# Patient Record
Sex: FEMALE | Race: WHITE | Employment: FULL TIME | URBAN - METROPOLITAN AREA
[De-identification: names, ages, dates, MRNs, and addresses within clinical notes are randomized per-mention and may not be internally consistent; named-entity substitution may affect disease eponyms.]

---

## 2021-03-27 ENCOUNTER — OFFICE VISIT (OUTPATIENT)
Dept: PRIMARY CARE CLINIC | Age: 56
End: 2021-03-27
Payer: COMMERCIAL

## 2021-03-27 VITALS
SYSTOLIC BLOOD PRESSURE: 122 MMHG | DIASTOLIC BLOOD PRESSURE: 80 MMHG | TEMPERATURE: 97.3 F | OXYGEN SATURATION: 95 % | HEIGHT: 66 IN | WEIGHT: 195.2 LBS | HEART RATE: 84 BPM | BODY MASS INDEX: 31.37 KG/M2

## 2021-03-27 DIAGNOSIS — M75.41 CORACOID IMPINGEMENT OF RIGHT SHOULDER: ICD-10-CM

## 2021-03-27 DIAGNOSIS — K58.0 IRRITABLE BOWEL SYNDROME WITH DIARRHEA: ICD-10-CM

## 2021-03-27 DIAGNOSIS — F41.8 DEPRESSION WITH ANXIETY: Primary | ICD-10-CM

## 2021-03-27 DIAGNOSIS — Z13.1 DIABETES MELLITUS SCREENING: ICD-10-CM

## 2021-03-27 DIAGNOSIS — S83.206S TEAR OF MENISCUS OF RIGHT KNEE AS CURRENT INJURY, UNSPECIFIED MENISCUS, UNSPECIFIED TEAR TYPE, SEQUELA: ICD-10-CM

## 2021-03-27 DIAGNOSIS — Z13.220 SCREENING CHOLESTEROL LEVEL: ICD-10-CM

## 2021-03-27 DIAGNOSIS — M17.10 ARTHRITIS OF KNEE: ICD-10-CM

## 2021-03-27 DIAGNOSIS — M77.8 SHOULDER TENDONITIS, RIGHT: ICD-10-CM

## 2021-03-27 DIAGNOSIS — Z00.00 WELLNESS EXAMINATION: ICD-10-CM

## 2021-03-27 DIAGNOSIS — Z12.31 BREAST CANCER SCREENING BY MAMMOGRAM: ICD-10-CM

## 2021-03-27 PROBLEM — S83.206A TEAR OF MENISCUS OF RIGHT KNEE AS CURRENT INJURY: Status: ACTIVE | Noted: 2021-03-27

## 2021-03-27 PROCEDURE — 99203 OFFICE O/P NEW LOW 30 MIN: CPT | Performed by: FAMILY MEDICINE

## 2021-03-27 PROCEDURE — G8417 CALC BMI ABV UP PARAM F/U: HCPCS | Performed by: FAMILY MEDICINE

## 2021-03-27 PROCEDURE — 1036F TOBACCO NON-USER: CPT | Performed by: FAMILY MEDICINE

## 2021-03-27 PROCEDURE — G8484 FLU IMMUNIZE NO ADMIN: HCPCS | Performed by: FAMILY MEDICINE

## 2021-03-27 PROCEDURE — G8427 DOCREV CUR MEDS BY ELIG CLIN: HCPCS | Performed by: FAMILY MEDICINE

## 2021-03-27 PROCEDURE — 3017F COLORECTAL CA SCREEN DOC REV: CPT | Performed by: FAMILY MEDICINE

## 2021-03-27 RX ORDER — ASCORBIC ACID 250 MG
250 TABLET ORAL DAILY
Qty: 30 TABLET | Refills: 3 | COMMUNITY
Start: 2021-03-27 | End: 2022-03-18

## 2021-03-27 RX ORDER — ESCITALOPRAM OXALATE 10 MG/1
10 TABLET ORAL DAILY
Qty: 90 TABLET | Refills: 2
Start: 2021-03-27 | End: 2021-07-12 | Stop reason: SDUPTHER

## 2021-03-27 RX ORDER — GREEN TEA/HOODIA GORDONII 315-12.5MG
1 CAPSULE ORAL DAILY
Qty: 30 TABLET | Refills: 0 | COMMUNITY
Start: 2021-03-27 | End: 2021-04-26

## 2021-03-27 RX ORDER — M-VIT,TX,IRON,MINS/CALC/FOLIC 27MG-0.4MG
1 TABLET ORAL DAILY
COMMUNITY
Start: 2021-03-27

## 2021-03-27 SDOH — ECONOMIC STABILITY: FOOD INSECURITY: WITHIN THE PAST 12 MONTHS, YOU WORRIED THAT YOUR FOOD WOULD RUN OUT BEFORE YOU GOT MONEY TO BUY MORE.: NEVER TRUE

## 2021-03-27 SDOH — ECONOMIC STABILITY: FOOD INSECURITY: WITHIN THE PAST 12 MONTHS, THE FOOD YOU BOUGHT JUST DIDN'T LAST AND YOU DIDN'T HAVE MONEY TO GET MORE.: NEVER TRUE

## 2021-03-27 SDOH — ECONOMIC STABILITY: INCOME INSECURITY: HOW HARD IS IT FOR YOU TO PAY FOR THE VERY BASICS LIKE FOOD, HOUSING, MEDICAL CARE, AND HEATING?: NOT HARD AT ALL

## 2021-03-27 SDOH — ECONOMIC STABILITY: TRANSPORTATION INSECURITY
IN THE PAST 12 MONTHS, HAS THE LACK OF TRANSPORTATION KEPT YOU FROM MEDICAL APPOINTMENTS OR FROM GETTING MEDICATIONS?: NO

## 2021-03-27 ASSESSMENT — PATIENT HEALTH QUESTIONNAIRE - PHQ9
1. LITTLE INTEREST OR PLEASURE IN DOING THINGS: 0
SUM OF ALL RESPONSES TO PHQ QUESTIONS 1-9: 0
SUM OF ALL RESPONSES TO PHQ9 QUESTIONS 1 & 2: 0

## 2021-03-27 NOTE — PROGRESS NOTES
[facility] PRIMARY CARE  [address] [place] [id number]  Dept: [phone]    Richard Alfonso is a 63 y.o. female New patient, who presents today for her medical conditions/complaintsas noted below. Chief Complaint   Patient presents with    New Patient       HPI:     HPI    Reviewed prior notes None  Reviewed previous none  Psyllium every other day. Pill for IBS  She needs cortisone in her knees off and on. Hx of knee issues x years: mensicus and arthritis  Colonoscopy at 46 yo in [place]. Is also living in [place]. has moved back to [facility] to be close to family. hot flashes x 20 years: came back worse.    No results found for: LDLCHOLESTEROL, LDLCALC    (goal LDL is <100)   No results found for: AST, ALT, BUN, LABA1C, TSH  BP Readings from Last 3 Encounters:   21 122/80          (goal 120/80)    Past Medical History:   Diagnosis Date    Anxiety     Asthma     Former     Depression     Minor     Sleep apnea     dont use a machine ( lost weight )      Past Surgical History:   Procedure Laterality Date    GALLBLADDER SURGERY      HYSTERECTOMY, VAGINAL      OVARIES ONLY        Family History   Problem Relation Age of Onset    Arrhythmia Mother     Cancer Mother     Depression Mother     High Cholesterol Mother     Diabetes Mother    [de-identified] / [place] Mother     Other Mother     Arrhythmia Father     Heart Disease Father     High Cholesterol Father     Other Maternal Grandmother        Social History     Tobacco Use    Smoking status: Former Smoker     Packs/day: 1.50     Years: 3.00     Pack years: 4.50     Types: Cigarettes     Quit date: 1998     Years since quittin.3    Smokeless tobacco: Never Used   Substance Use Topics    Alcohol use: Yes     Frequency: Never     Comment: Once in awhile       Current Outpatient Medications   Medication Sig Dispense Refill    escitalopram (LEXAPRO) 10 MG tablet Take 1 tablet by mouth daily 90 tablet 2    Multiple Vitamins-Minerals (THERAPEUTIC MULTIVITAMIN-MINERALS) tablet Take 1 tablet by mouth daily      Probiotic Acidophilus (FLORANEX) TABS Take 1 tablet by mouth daily 30 tablet 0    ascorbic acid (V-R VITAMIN C) 250 MG tablet Take 1 tablet by mouth daily 30 tablet 3     No current facility-administered medications for this visit. Allergies   Allergen Reactions    Bactrim [Sulfamethoxazole-Trimethoprim] Hives and Rash       Health Maintenance   Topic Date Due    Hepatitis C screen  Never done    HIV screen  Never done    DTaP/Tdap/Td vaccine (1 - Tdap) Never done    Cervical cancer screen  Never done    Lipid screen  Never done    Diabetes screen  Never done    Breast cancer screen  Never done    Shingles Vaccine (1 of 2) Never done    Colon cancer screen colonoscopy  Never done    Flu vaccine (1) 09/01/2020    COVID-19 Vaccine  Completed    Hepatitis A vaccine  Aged Out    Hepatitis B vaccine  Aged Out    Hib vaccine  Aged Out    Meningococcal (ACWY) vaccine  Aged Out    Pneumococcal 0-64 years Vaccine  Aged Out       Subjective:      Review of Systems  Knee pains,   Right shoulder pains  Works as a   She has flat feet and her shoes she noticed her wearing down on inward heel. The shoes she is wearing are not very supportive for the feet. Objective:     /80   Pulse 84   Temp 97.3 °F (36.3 °C)   Ht 5' 6\" (1.676 m)   Wt 195 lb 3.2 oz (88.5 kg)   SpO2 95%   BMI 31.51 kg/m²   Physical Exam  Vitals signs and nursing note reviewed. Constitutional:       General: She is not in acute distress. Appearance: She is well-developed. She is not ill-appearing. HENT:      Head: Normocephalic and atraumatic. Right Ear: External ear normal.      Left Ear: External ear normal.   Eyes:      General: No scleral icterus. Right eye: No discharge. Left eye: No discharge.       Conjunctiva/sclera: Conjunctivae normal.      Pupils: Pupils are equal, round, and reactive to light. Neck:      Thyroid: No thyromegaly. Trachea: No tracheal deviation. Cardiovascular:      Rate and Rhythm: Normal rate and regular rhythm. Heart sounds: Normal heart sounds. Pulmonary:      Effort: Pulmonary effort is normal. No respiratory distress. Breath sounds: Normal breath sounds. No wheezing. Musculoskeletal:      Comments: Right shoulder ROM decreased abduction to 90 degrees  Forward flexion 160 degrees, decreased rotation  Tender shoulder post cuff. Lymphadenopathy:      Cervical: No cervical adenopathy. Skin:     General: Skin is warm. Findings: No rash. Neurological:      Mental Status: She is alert and oriented to person, place, and time. Psychiatric:         Mood and Affect: Mood normal.         Behavior: Behavior normal.         Thought Content: Thought content normal.       Assessment:       Diagnosis Orders   1. Depression with anxiety  escitalopram (LEXAPRO) 10 MG tablet   2. Irritable bowel syndrome with diarrhea     3. Tear of meniscus of right knee as current injury, unspecified meniscus, unspecified tear type, sequela     4. Arthritis of knee     5. Breast cancer screening by mammogram  DHIRAJ DIGITAL SCREEN W OR WO CAD BILATERAL   6. Shoulder tendonitis, right     7. Coracoid impingement of right shoulder     8. Wellness examination     9. Diabetes mellitus screening  Glucose, Fasting   10. Screening cholesterol level  Lipid Panel        Plan:      No follow-ups on file. Soy milk and estroven for hot flashes  Right shoulder exercises and if not better in 3 weeks see PT  Needs good supportive shoes for the feet. She states her physical is due in July. She should try to get a copy of her colonoscopy so we can register this in her chart.     Orders Placed This Encounter   Procedures    DHIRAJ DIGITAL SCREEN W OR WO CAD BILATERAL     Standing Status:   Future     Standing Expiration Date:   3/27/2022     Scheduling

## 2021-03-27 NOTE — PATIENT INSTRUCTIONS
Patient Education        Rotator Cuff: Exercises  Introduction  Here are some examples of exercises for you to try. The exercises may be suggested for a condition or for rehabilitation. Start each exercise slowly. Ease off the exercises if you start to have pain. You will be told when to start these exercises and which ones will work best for you. How to do the exercises  Pendulum swing   If you have pain in your back, do not do this exercise. 1. Hold on to a table or the back of a chair with your good arm. Then bend forward a little and let your sore arm hang straight down. This exercise does not use the arm muscles. Rather, use your legs and your hips to create movement that makes your arm swing freely. 2. Use the movement from your hips and legs to guide the slightly swinging arm back and forth like a pendulum (or elephant trunk). Then guide it in circles that start small (about the size of a dinner plate). Make the circles a bit larger each day, as your pain allows. 3. Do this exercise for 5 minutes, 5 to 7 times each day. 4. As you have less pain, try bending over a little farther to do this exercise. This will increase the amount of movement at your shoulder. Posterior stretching exercise   1. Hold the elbow of your injured arm with your other hand. 2. Use your hand to pull your injured arm gently up and across your body. You will feel a gentle stretch across the back of your injured shoulder. 3. Hold for at least 15 to 30 seconds. Then slowly lower your arm. 4. Repeat 2 to 4 times. Up-the-back stretch   Your doctor or physical therapist may want you to wait to do this stretch until you have regained most of your range of motion and strength. You can do this stretch in different ways. Hold any of these stretches for at least 15 to 30 seconds. Repeat them 2 to 4 times. 1. Light stretch: Put your hand in your back pocket. Let it rest there to stretch your shoulder. 2. Moderate stretch:  With your other hand, hold your injured arm (palm outward) behind your back by the wrist. Pull your arm up gently to stretch your shoulder. 3. Advanced stretch: Put a towel over your other shoulder. Put the hand of your injured arm behind your back. Now hold the back end of the towel. With the other hand, hold the front end of the towel in front of your body. Pull gently on the front end of the towel. This will bring your hand farther up your back to stretch your shoulder. Overhead stretch   1. Standing about an arm's length away, grasp onto a solid surface. You could use a countertop, a doorknob, or the back of a sturdy chair. 2. With your knees slightly bent, bend forward with your arms straight. Lower your upper body, and let your shoulders stretch. 3. As your shoulders are able to stretch farther, you may need to take a step or two backward. 4. Hold for at least 15 to 30 seconds. Then stand up and relax. If you had stepped back during your stretch, step forward so you can keep your hands on the solid surface. 5. Repeat 2 to 4 times. Shoulder flexion (lying down)   To make a wand for this exercise, use a piece of PVC pipe or a broom handle with the broom removed. Make the wand about a foot wider than your shoulders. 1. Lie on your back, holding a wand with both hands. Your palms should face down as you hold the wand. 2. Keeping your elbows straight, slowly raise your arms over your head. Raise them until you feel a stretch in your shoulders, upper back, and chest.  3. Hold for 15 to 30 seconds. 4. Repeat 2 to 4 times. Shoulder rotation (lying down)   To make a wand for this exercise, use a piece of PVC pipe or a broom handle with the broom removed. Make the wand about a foot wider than your shoulders. 1. Lie on your back. Hold a wand with both hands with your elbows bent and palms up. 2. Keep your elbows close to your body, and move the wand across your body toward the sore arm.   3. Hold for 8 to 12 seconds. 4. Repeat 2 to 4 times. Wall climbing (to the side)   Avoid any movement that is straight to your side, and be careful not to arch your back. Your arm should stay about 30 degrees to the front of your side. 1. Stand with your side to a wall so that your fingers can just touch it at an angle about 30 degrees toward the front of your body. 2. Walk the fingers of your injured arm up the wall as high as pain permits. Try not to shrug your shoulder up toward your ear as you move your arm up. 3. Hold that position for a count of at least 15 to 20.  4. Walk your fingers back down to the starting position. 5. Repeat at least 2 to 4 times. Try to reach higher each time. Wall climbing (to the front)   During this stretching exercise, be careful not to arch your back. 1. Face a wall, and stand so your fingers can just touch it. 2. Keeping your shoulder down, walk the fingers of your injured arm up the wall as high as pain permits. (Don't shrug your shoulder up toward your ear.)  3. Hold your arm in that position for at least 15 to 30 seconds. 4. Slowly walk your fingers back down to where you started. 5. Repeat at least 2 to 4 times. Try to reach higher each time. Shoulder blade squeeze   1. Stand with your arms at your sides, and squeeze your shoulder blades together. Do not raise your shoulders up as you squeeze. 2. Hold 6 seconds. 3. Repeat 8 to 12 times. Scapular exercise: Arm reach   1. Lie flat on your back. This exercise is a very slight motion that starts with your arms raised (elbows straight, arms straight). 2. From this position, reach higher toward the kostas or ceiling. Keep your elbows straight. All motion should be from your shoulder blade only. 3. Relax your arms back to where you started. 4. Repeat 8 to 12 times. Arm raise to the side   During this strengthening exercise, your arm should stay about 30 degrees to the front of your side.   1. Slowly raise your injured arm to the side, with your thumb facing up. Raise your arm 60 degrees at the most (shoulder level is 90 degrees). 2. Hold the position for 3 to 5 seconds. Then lower your arm back to your side. If you need to, bring your \"good\" arm across your body and place it under the elbow as you lower your injured arm. Use your good arm to keep your injured arm from dropping down too fast.  3. Repeat 8 to 12 times. 4. When you first start out, don't hold any extra weight in your hand. As you get stronger, you may use a 1-pound to 2-pound dumbbell or a small can of food. Shoulder flexor and extensor exercise   These are isometric exercises. That means you contract your muscles without actually moving. 1. Push forward (flex): Stand facing a wall or doorjamb, about 6 inches or less back. Hold your injured arm against your body. Make a closed fist with your thumb on top. Then gently push your hand forward into the wall with about 25% to 50% of your strength. Don't let your body move backward as you push. Hold for about 6 seconds. Relax for a few seconds. Repeat 8 to 12 times. 2. Push backward (extend): Stand with your back flat against a wall. Your upper arm should be against the wall, with your elbow bent 90 degrees (your hand straight ahead). Push your elbow gently back against the wall with about 25% to 50% of your strength. Don't let your body move forward as you push. Hold for about 6 seconds. Relax for a few seconds. Repeat 8 to 12 times. Scapular exercise: Wall push-ups   This exercise is best done with your fingers somewhat turned out, rather than straight up and down. 1. Stand facing a wall, about 12 inches to 18 inches away. 2. Place your hands on the wall at shoulder height. 3. Slowly bend your elbows and bring your face to the wall. Keep your back and hips straight. 4. Push back to where you started. 5. Repeat 8 to 12 times.   6. When you can do this exercise against a wall comfortably, you can try it against a counter. You can then slowly progress to the end of a couch, then to a sturdy chair, and finally to the floor. Scapular exercise: Retraction   For this exercise, you will need elastic exercise material, such as surgical tubing or Thera-Band. 1. Put the band around a solid object at about waist level. (A bedpost will work well.) Each hand should hold an end of the band. 2. With your elbows at your sides and bent to 90 degrees, pull the band back. Your shoulder blades should move toward each other. Then move your arms back where you started. 3. Repeat 8 to 12 times. 4. If you have good range of motion in your shoulders, try this exercise with your arms lifted out to the sides. Keep your elbows at a 90-degree angle. Raise the elastic band up to about shoulder level. Pull the band back to move your shoulder blades toward each other. Then move your arms back where you started. Internal rotator strengthening exercise   1. Start by tying a piece of elastic exercise material to a doorknob. You can use surgical tubing or Thera-Band. 2. Stand or sit with your shoulder relaxed and your elbow bent 90 degrees. Your upper arm should rest comfortably against your side. Squeeze a rolled towel between your elbow and your body for comfort. This will help keep your arm at your side. 3. Hold one end of the elastic band in the hand of the painful arm. 4. Slowly rotate your forearm toward your body until it touches your belly. Slowly move it back to where you started. 5. Keep your elbow and upper arm firmly tucked against the towel roll or at your side. 6. Repeat 8 to 12 times. External rotator strengthening exercise   1. Start by tying a piece of elastic exercise material to a doorknob. You can use surgical tubing or Thera-Band. (You may also hold one end of the band in each hand.)  2. Stand or sit with your shoulder relaxed and your elbow bent 90 degrees. Your upper arm should rest comfortably against your side. Squeeze a rolled towel between your elbow and your body for comfort. This will help keep your arm at your side. 3. Hold one end of the elastic band with the hand of the painful arm. 4. Start with your forearm across your belly. Slowly rotate the forearm out away from your body. Keep your elbow and upper arm tucked against the towel roll or the side of your body until you begin to feel tightness in your shoulder. Slowly move your arm back to where you started. 5. Repeat 8 to 12 times. Follow-up care is a key part of your treatment and safety. Be sure to make and go to all appointments, and call your doctor if you are having problems. It's also a good idea to know your test results and keep a list of the medicines you take. Where can you learn more? Go to https://chnancieb.NVELO. org and sign in to your [employer] account. Enter Juanito Dolan in the Flukle box to learn more about \"Rotator Cuff: Exercises. \"     If you do not have an account, please click on the \"Sign Up Now\" link. Current as of: November 16, 2020               Content Version: 12.8  © 0498-2926 [employer], [employer]. Care instructions adapted under license by [facility] ([facility]). If you have questions about a medical condition or this instruction, always ask your healthcare professional. [place] [address] any warranty or liability for your use of this information. Patient Education        Knee Arthritis: Exercises  Introduction  Here are some examples of exercises for you to try. The exercises may be suggested for a condition or for rehabilitation. Start each exercise slowly. Ease off the exercises if you start to have pain. You will be told when to start these exercises and which ones will work best for you. How to do the exercises  Knee flexion with heel slide   5. Lie on your back with your knees bent. 6. Slide your heel back by bending your affected knee as far as you can.  Then hook your other foot around your ankle to help pull your heel even farther back. 7. Hold for about 6 seconds, then rest for up to 10 seconds. 8. Repeat 8 to 12 times. 9. Switch legs and repeat steps 1 through 4, even if only one knee is sore. Quad sets   5. Sit with your affected leg straight and supported on the floor or a firm bed. Place a small, rolled-up towel under your knee. Your other leg should be bent, with that foot flat on the floor. 6. Tighten the thigh muscles of your affected leg by pressing the back of your knee down into the towel. 7. Hold for about 6 seconds, then rest for up to 10 seconds. 8. Repeat 8 to 12 times. 9. Switch legs and repeat steps 1 through 4, even if only one knee is sore. Straight-leg raises to the front   4. Lie on your back with your good knee bent so that your foot rests flat on the floor. Your affected leg should be straight. Make sure that your low back has a normal curve. You should be able to slip your hand in between the floor and the small of your back, with your palm touching the floor and your back touching the back of your hand. 5. Tighten the thigh muscles in your affected leg by pressing the back of your knee flat down to the floor. Hold your knee straight. 6. Keeping the thigh muscles tight and your leg straight, lift your affected leg up so that your heel is about 12 inches off the floor. Hold for about 6 seconds, then lower slowly. 7. Relax for up to 10 seconds between repetitions. 8. Repeat 8 to 12 times. 9. Switch legs and repeat steps 1 through 5, even if only one knee is sore. Active knee flexion   6. Lie on your stomach with your knees straight. If your kneecap is uncomfortable, roll up a washcloth and put it under your leg just above your kneecap. 7. Lift the foot of your affected leg by bending the knee so that you bring the foot up toward your buttock. If this motion hurts, try it without bending your knee quite as far.  This may help you avoid any

## 2021-03-30 ENCOUNTER — HOSPITAL ENCOUNTER (OUTPATIENT)
Dept: MAMMOGRAPHY | Age: 56
Discharge: HOME | End: 2021-04-01
Payer: COMMERCIAL

## 2021-03-30 DIAGNOSIS — Z12.31 BREAST CANCER SCREENING BY MAMMOGRAM: ICD-10-CM

## 2021-03-30 PROCEDURE — 77063 BREAST TOMOSYNTHESIS BI: CPT

## 2021-07-12 ENCOUNTER — OFFICE VISIT (OUTPATIENT)
Dept: PRIMARY CARE CLINIC | Age: 56
End: 2021-07-12
Payer: COMMERCIAL

## 2021-07-12 VITALS
DIASTOLIC BLOOD PRESSURE: 78 MMHG | OXYGEN SATURATION: 98 % | BODY MASS INDEX: 33.44 KG/M2 | WEIGHT: 207.2 LBS | HEART RATE: 92 BPM | SYSTOLIC BLOOD PRESSURE: 116 MMHG

## 2021-07-12 DIAGNOSIS — Z00.00 WELLNESS EXAMINATION: Primary | ICD-10-CM

## 2021-07-12 DIAGNOSIS — F41.8 DEPRESSION WITH ANXIETY: ICD-10-CM

## 2021-07-12 DIAGNOSIS — K58.0 IRRITABLE BOWEL SYNDROME WITH DIARRHEA: ICD-10-CM

## 2021-07-12 PROCEDURE — 99396 PREV VISIT EST AGE 40-64: CPT | Performed by: FAMILY MEDICINE

## 2021-07-12 RX ORDER — LOPERAMIDE HYDROCHLORIDE 2 MG/1
2 CAPSULE ORAL 4 TIMES DAILY PRN
COMMUNITY

## 2021-07-12 RX ORDER — ESCITALOPRAM OXALATE 10 MG/1
TABLET ORAL
Qty: 90 TABLET | Refills: 2 | Status: SHIPPED | OUTPATIENT
Start: 2021-07-12 | End: 2022-03-18 | Stop reason: ALTCHOICE

## 2021-07-12 RX ORDER — ESCITALOPRAM OXALATE 10 MG/1
5 TABLET ORAL DAILY
Qty: 90 TABLET | Refills: 2
Start: 2021-07-12 | End: 2021-07-12 | Stop reason: SDUPTHER

## 2021-07-12 RX ORDER — DICYCLOMINE HYDROCHLORIDE 10 MG/1
10 CAPSULE ORAL 4 TIMES DAILY PRN
Qty: 120 CAPSULE | Refills: 5 | Status: SHIPPED | OUTPATIENT
Start: 2021-07-12 | End: 2022-03-18

## 2021-07-12 RX ORDER — METHYLCELLULOSE 500 MG/1
TABLET ORAL
COMMUNITY

## 2021-07-12 NOTE — PATIENT INSTRUCTIONS
Patient Education        Well Visit, Women 48 to 72: Care Instructions  Overview     Well visits can help you stay healthy. Your doctor has checked your overall health and may have suggested ways to take good care of yourself. Your doctor also may have recommended tests. At home, you can help prevent illness with healthy eating, regular exercise, and other steps. Follow-up care is a key part of your treatment and safety. Be sure to make and go to all appointments, and call your doctor if you are having problems. It's also a good idea to know your test results and keep a list of the medicines you take. How can you care for yourself at home? · Get screening tests that you and your doctor decide on. Screening helps find diseases before any symptoms appear. · Eat healthy foods. Choose fruits, vegetables, whole grains, protein, and low-fat dairy foods. Limit fat, especially saturated fat. Reduce salt in your diet. · Limit alcohol. Have no more than 1 drink a day or 7 drinks a week. · Get at least 30 minutes of exercise on most days of the week. Walking is a good choice. You also may want to do other activities, such as running, swimming, cycling, or playing tennis or team sports. · Reach and stay at a healthy weight. This will lower your risk for many problems, such as obesity, diabetes, heart disease, and high blood pressure. · Do not smoke. Smoking can make health problems worse. If you need help quitting, talk to your doctor about stop-smoking programs and medicines. These can increase your chances of quitting for good. · Care for your mental health. It is easy to get weighed down by worry and stress. Learn strategies to manage stress, like deep breathing and mindfulness, and stay connected with your family and community. If you find you often feel sad or hopeless, talk with your doctor. Treatment can help.   · Talk to your doctor about whether you have any risk factors for sexually transmitted infections (STIs). You can help prevent STIs if you wait to have sex with a new partner (or partners) until you've each been tested for STIs. It also helps if you use condoms (male or female condoms) and if you limit your sex partners to one person who only has sex with you. Vaccines are available for some STIs. · If you think you may have a problem with alcohol or drug use, talk to your doctor. This includes prescription medicines (such as amphetamines and opioids) and illegal drugs (such as cocaine and methamphetamine). Your doctor can help you figure out what type of treatment is best for you. · Protect your skin from too much sun. When you're outdoors from 10 a.m. to 4 p.m., stay in the shade or cover up with clothing and a hat with a wide brim. Wear sunglasses that block UV rays. Even when it's cloudy, put broad-spectrum sunscreen (SPF 30 or higher) on any exposed skin. · See a dentist one or two times a year for checkups and to have your teeth cleaned. · Wear a seat belt in the car. When should you call for help? Watch closely for changes in your health, and be sure to contact your doctor if you have any problems or symptoms that concern you. Where can you learn more? Go to [email or web address] [email or web address] org and sign in to your Zero Locus account. Enter I871 in the [facility] box to learn more about \"Well Visit, Women 50 to 72: Care Instructions. \"     If you do not have an account, please click on the \"Sign Up Now\" link. Current as of: May 27, 2020               Content Version: 12.9  © 9810-7857 Healthwise, [employer]. Care instructions adapted under license by [facility] ([facility]). If you have questions about a medical condition or this instruction, always ask your healthcare professional. [place] [address] any warranty or liability for your use of this information.          Patient Education        tetanus, diphtheria, acellular pertussis vaccine (Tdap)  Pronunciation:  TET a nus, the Tdap vaccine may not provide protection from disease in every person. What should I discuss with my healthcare provider before receiving this vaccine? You should not receive this vaccine if:  · you had a life-threatening allergic reaction to any vaccine that contains tetanus, diphtheria, or pertussis; or  · you had a neurologic disorder affecting your brain (such as loss of consciousness or a prolonged seizure) within 7 days after having a previous pertussis vaccine. You may not be able to receive a Tdap vaccine if you have ever received a similar vaccine that caused any of the following:  · a very high fever (over 104 degrees Fahrenheit);  · a neurologic disorder or disease affecting the brain;  · fainting or going into shock;  · severe pain, redness, tenderness, swelling, or a lump where the shot was given;  · an allergy to latex rubber;  · severe or uncontrolled epilepsy or other seizure disorder; or  · Guillain-Barré syndrome (within 6 weeks after receiving a vaccine containing tetanus). If you have any of these other conditions, your vaccine may need to be postponed or not given at all:  · a history of seizures;  · a weak immune system caused by disease, bone marrow transplant, or by using certain medicines or receiving cancer treatments; or  · if it has been less than 10 years since you last received a tetanus shot. You can still receive a vaccine if you have a minor cold. In the case of a more severe illness with a fever or any type of infection, wait until you get better before receiving this vaccine. It is not known whether Tdap vaccine will harm an unborn baby. However, you may need a Tdap vaccine during pregnancy to protect your  baby from pertussis. [facility] babies are most at risk for severe, life-threatening complications from pertussis. Your doctor should determine whether you need this vaccine during pregnancy. If you are pregnant, your name may be listed on a pregnancy registry.  This is to track the outcome of the pregnancy and to evaluate any effects of the Tdap vaccine on the baby. It is not known whether Tdap vaccine passes into breast milk or if it could harm a nursing baby. Tell your doctor if you are breast-feeding a baby. The adult version of this vaccine (Adacel, Boostrix) should not be given to anyone under the age of 8. Another vaccine is available for use in children younger than 8years old. How is this vaccine given? This vaccine is given as an injection (shot) into a muscle. You will receive this injection in a doctor's office or clinic setting. Tdap vaccine is usually given as a one-time injection. Unless your doctor's tells you otherwise, you will not need a booster vaccine. Tdap vaccine is usually given once every 10 years. What happens if I miss a dose? Since the Tdap vaccine is usually given only once, you are not likely to miss a dose. What happens if I overdose? An overdose of this vaccine is unlikely to occur. What should I avoid before or after receiving this vaccine? Follow your doctor's instructions about any restrictions on food, beverages, or activity after receiving a Tdap vaccine. What are the possible side effects of this vaccine? Keep track of any and all side effects you have after receiving this vaccine. If you ever need to receive a booster dose, you will need to tell your doctor if the previous shot caused any side effects. You should not receive a booster vaccine if you had a life threatening allergic reaction after the first shot. Becoming infected with diphtheria, pertussis, or tetanus is much more dangerous to your health than receiving this vaccine. However, like any medicine, this vaccine can cause side effects but the risk of serious side effects is extremely low. Get emergency medical help if you have signs of an allergic reaction: hives; difficult breathing; swelling of your face, lips, tongue, or throat.   Call your doctor at once if you have any of these side effects within 7 days after receiving Tdap vaccine:  · numbness, weakness, or tingling in your feet and legs;  · problems with walking or coordination;  · sudden pain in your arms or shoulders;  · a light-headed feeling, like you might pass out;  · vision problems, ringing in your ears;  · seizure (black-out or convulsions); or  · redness, swelling, bleeding, or severe pain where the shot was given. Common side effects may include:  · mild pain or tenderness where the shot was given;  · headache or tiredness;  · body aches; or  · mild nausea, diarrhea, or vomiting. This is not a complete list of side effects and others may occur. Call your doctor for medical advice about side effects. You may report vaccine side effects to the [place] [address] and Human Services at [phone]. What other drugs will affect tetanus, diphtheria, acellular pertussis vaccine? Before receiving this vaccine, tell your doctor about all other vaccines you have recently received. Also tell the doctor if you have recently received drugs or treatments that can weaken the immune system, including:  · an oral, nasal, inhaled, or injectable steroid medicine;  · medications to treat psoriasis, rheumatoid arthritis, or other autoimmune disorders; or  · medicines to treat or prevent organ transplant rejection. If you are using any of these medications, you may not be able to receive the vaccine, or may need to wait until the other treatments are finished. This list is not complete. Other drugs may interact with this vaccine, including prescription and over-the-counter medicines, vitamins, and herbal products. Not all possible interactions are listed in this medication guide. Where can I get more information? Your doctor or pharmacist can provide more information about this vaccine.  Additional information is available from your local health department or the Centers for Disease Control and warranty or liability for your use of this information. Patient Education        Recombinant Zoster (Shingles) Vaccine: What You Need to Know  Why get vaccinated? Recombinant zoster (shingles) vaccine can prevent shingles. Shingles (also called herpes zoster, or just zoster) is a painful skin rash, usually with blisters. In addition to the rash, shingles can cause fever, headache, chills, or upset stomach. More rarely, shingles can lead to pneumonia, hearing problems, blindness, brain inflammation (encephalitis), or death. The most common complication of shingles is long-term nerve pain called postherpetic neuralgia (PHN). PHN occurs in the areas where the shingles rash was, even after the rash clears up. It can last for months or years after the rash goes away. The pain from PHN can be severe and debilitating. About 10 to 18% of people who get shingles will experience PHN. The risk of PHN increases with age. An older adult with shingles is more likely to develop PHN and have longer lasting and more severe pain than a younger person with shingles. Shingles is caused by the varicella zoster virus, the same virus that causes chickenpox. After you have chickenpox, the virus stays in your body and can cause shingles later in life. Shingles cannot be passed from one person to another, but the virus that causes shingles can spread and cause chickenpox in someone who had never had chickenpox or received chickenpox vaccine. Recombinant shingles vaccine  Recombinant shingles vaccine provides strong protection against shingles. By preventing shingles, recombinant shingles vaccine also protects against PHN. Recombinant shingles vaccine is the preferred vaccine for the prevention of shingles. However, a different vaccine, live shingles vaccine, may be used in some circumstances. The recombinant shingles vaccine is recommended for adults 50 years and older without serious immune problems.  It is given as a two-dose series. This vaccine is also recommended for people who have already gotten another type of shingles vaccine, the live shingles vaccine. There is no live virus in this vaccine. Shingles vaccine may be given at the same time as other vaccines. Talk with your health care provider  Tell your vaccine provider if the person getting the vaccine:  · Has had an allergic reaction after a previous dose of recombinant shingles vaccine, or has any severe, life-threatening allergies. · Is pregnant or breastfeeding. · Is currently experiencing an episode of shingles. In some cases, your health care provider may decide to postpone shingles vaccination to a future visit. People with minor illnesses, such as a cold, may be vaccinated. People who are moderately or severely ill should usually wait until they recover before getting recombinant shingles vaccine. Your health care provider can give you more information. Risks of a vaccine reaction  · A sore arm with mild or moderate pain is very common after recombinant shingles vaccine, affecting about 80% of vaccinated people. Redness and swelling can also happen at the site of the injection. · Tiredness, muscle pain, headache, shivering, fever, stomach pain, and nausea happen after vaccination in more than half of people who receive recombinant shingles vaccine. In clinical trials, about 1 out of 6 people who got recombinant zoster vaccine experienced side effects that prevented them from doing regular activities. Symptoms usually went away on their own in 2 to 3 days. You should still get the second dose of recombinant zoster vaccine even if you had one of these reactions after the first dose. People sometimes faint after medical procedures, including vaccination. Tell your provider if you feel dizzy or have vision changes or ringing in the ears.   As with any medicine, there is a very remote chance of a vaccine causing a severe allergic reaction, other serious injury, or death.  What if there is a serious problem? An allergic reaction could occur after the vaccinated person leaves the clinic. If you see signs of a severe allergic reaction (hives, swelling of the face and throat, difficulty breathing, a fast heartbeat, dizziness, or weakness), call 9-1-1 and get the person to the nearest hospital.  For other signs that concern you, call your health care provider. Adverse reactions should be reported to the Vaccine Adverse Event Reporting System (VAERS). Your health care provider will usually file this report, or you can do it yourself. Visit the VAERS website at www.vaers. [facility].gov or call 5-646.990.8498. VAERS is only for reporting reactions, and VAERS staff do not give medical advice. How can I learn more? · Ask your health care provider. · Call your local or state health department. · Contact the Centers for Disease Control and Prevention (CDC):  ? Call 0-478.225.3898 (1-800-CDC-INFO) or  ? Visit CDC's website at www.cdc.gov/vaccines  Vaccine Information Statement  Recombinant Zoster Vaccine  10/30/2019  [facility] for Disease Control and Prevention  Many Vaccine Information Statements are available in Norwegian and other languages. See www.immunize.org/vis. Hojas de Información Sobre Vacunas están disponibles en Español y en muchos otros idiomas. Visite Theresa.si   Care instructions adapted under license by [facility] ([facility]). If you have questions about a medical condition or this instruction, always ask your healthcare professional. [place] [address] any warranty or liability for your use of this information. Patient Education        Varicose Veins: Care Instructions  Your Care Instructions  Varicose veins are twisted, enlarged veins near the surface of the skin. They develop most often in the legs and ankles.   Some people may be more likely than others to get varicose veins because of aging or hormone changes or because a parent has them. Being overweight or pregnant can make varicose veins worse. Jobs that require standing for long periods of time also can make them worse. Follow-up care is a key part of your treatment and safety. Be sure to make and go to all appointments, and call your doctor if you are having problems. It's also a good idea to know your test results and keep a list of the medicines you take. How can you care for yourself at home? · Wear compression stockings during the day to help relieve symptoms. They improve blood flow and are the main treatment for varicose veins. Talk to your doctor about which ones to get and where to get them. · Prop up your legs at or above the level of your heart when possible. This helps keep the blood from pooling in your lower legs and improves blood flow to the rest of your body. · Avoid sitting and standing for long periods. This puts added stress on your veins. · Get regular exercise, and control your weight. Walk, bicycle, or swim to improve blood flow in your legs. · If you bump your leg so hard that you know it is likely to bruise, prop up your leg and put ice or a cold pack on the area for 10 to 20 minutes at a time. Try to do this every 1 to 2 hours for the next 3 days (when you are awake) or until the swelling goes down. Put a thin cloth between the ice and your skin. · If you cut or scratch the skin over a vein, it may bleed a lot. Prop up your leg and apply firm pressure with a clean bandage over the site of the bleeding. Continue to apply pressure for a full 15 minutes. Do not check sooner to see if the bleeding has stopped. If the bleeding has not stopped after 15 minutes, apply pressure again for another 15 minutes. You can repeat this up to 3 times for a total of 45 minutes. If you have a blood clot in a varicose vein, you may have tenderness and swelling over the vein. The vein may feel firm.  Be sure to call your doctor right away if you have these symptoms. If your doctor has told you how to care for the clot, follow his or her instructions. Care may include the following:  · Prop up your leg and apply heat with a warm, damp cloth or a heating pad set on low (put a towel or cloth between your leg and the heating pad to prevent burns). · Ask your doctor if you can take an over-the-counter pain medicine, such as acetaminophen (Tylenol), ibuprofen (Advil, Motrin), or naproxen (Aleve). Be safe with medicines. Read and follow all instructions on the label. When should you call for help? Call 911 anytime you think you may need emergency care. For example, call if:    · You have sudden chest pain and shortness of breath, or you cough up blood. Call your doctor now or seek immediate medical care if:    · You have signs of a blood clot, such as:  ? Pain in your calf, back of the knee, thigh, or groin. ? Redness and swelling in your leg or groin.     · A varicose vein begins to bleed and you cannot stop it.     · You have a tender lump in your leg.     · You get an open sore. Watch closely for changes in your health, and be sure to contact your doctor if:    · Your varicose vein symptoms do not improve with home treatment. Where can you learn more? Go to https://[email or web address] XR.[email or web address]. org and sign in to your [email or web address] account. Enter P353 in the [employer]Cube box to learn more about \"Varicose Veins: Care Instructions. \"     If you do not have an account, please click on the \"Sign Up Now\" link. Current as of: November 4, 2020               Content Version: 12.9  © 2006-2021 [employer]stand. Care instructions adapted under license by [facility] ([facility]). If you have questions about a medical condition or this instruction, always ask your healthcare professional. [place] [address] any warranty or liability for your use of this information.          Patient Education        Learning About Endovenous Ablation for Varicose Veins  What is endovenous ablation for varicose veins? Endovenous ablation is a procedure to close off varicose veins. Endovenous means that the procedure is done inside the vein. Ablation means a doctor uses heat to damage and close off the vein. Varicose veins are twisted, enlarged veins near the surface of the skin. The heat damages a vein, and scar tissue forms. This scar tissue closes the vein. A closed vein loses its source of blood and dies. Eventually, you won't be able to see the veins anymore. The heat used for ablation can come from a laser or radio waves. A laser is a highly focused beam of light. Certain radio waves can make energy and heat called radiofrequency energy. How is the procedure done? The procedure is usually done in your doctor's office. You may wear some type of eye protection. You'll be given medicine so you will not feel anything or you will feel relaxed. The procedure takes less than 1 hour. Your doctor will insert a needle and wire into the vein. A thin tube (catheter) is placed over the wire and moved into the vein. Your doctor uses the catheter and special tools to send energy into the vein. The energy damages the tissue inside the vein. What can you expect after treatment? You may have a bandage and some bruising along the vein that was treated. You will need to wear compression stockings for 1 week or more. You can do your usual activities, but avoid vigorous exercise for about 1 week. Most people can get back to their normal routine right away. Follow-up care is a key part of your treatment and safety. Be sure to make and go to all appointments, and call your doctor if you are having problems. It's also a good idea to know your test results and keep a list of the medicines you take. Where can you learn more? Go to https://kate.WorldWinger. org and sign in to your Readz account.  Enter  in the MyMedLeads.com box to learn more about \"Learning About Endovenous Ablation for Varicose Veins. \"     If you do not have an account, please click on the \"Sign Up Now\" link. Current as of: November 4, 2020               Content Version: 12.9  © 2006-2021 Healthwise, Incorporated. Care instructions adapted under license by [facility] ([facility]). If you have questions about a medical condition or this instruction, always ask your healthcare professional. [address] any warranty or liability for your use of this information.

## 2021-07-12 NOTE — PROGRESS NOTES
[facility]  [address]  Phone: [phone]  Fax: [phone]    Kathleen Zimmerman is a 61 y.o. female who presents today for her medical conditions/complaintsas noted below. Kathleen Zimmerman is c/o of Annual Exam      HPI:     Diet ; somewhat healthy, as dong [employer] for a while  Exercise : not outside work  Dentist: sees  Eye doctor. Sees    Works as    Has better work shoes and that's helped the foot pain. Med is helping anxiety. IBS worse. Sometimes bloated  And gets diarrhea a lot for 2 hrs at a time.   Takes citracel fiber and imodium       Past Medical History:   Diagnosis Date    Anxiety     Asthma     Former     Depression     Minor     Sleep apnea     dont use a machine ( lost weight )      Past Surgical History:   Procedure Laterality Date    GALLBLADDER SURGERY      HYSTERECTOMY, VAGINAL      OVARIES ONLY      Family History   Problem Relation Age of Onset    Arrhythmia Mother     Cancer Mother     Depression Mother     High Cholesterol Mother     Diabetes Mother    [de-identified] / [place] Mother     Other Mother     Arrhythmia Father     Heart Disease Father     High Cholesterol Father     Other Maternal Grandmother      Social History     Tobacco Use    Smoking status: Former Smoker     Packs/day: 1.50     Years: 3.00     Pack years: 4.50     Types: Cigarettes     Quit date: 1998     Years since quittin.6    Smokeless tobacco: Never Used   Substance Use Topics    Alcohol use: Yes     Comment: Once in awhile       Current Outpatient Medications   Medication Sig Dispense Refill    Cholecalciferol (VITAMIN D3 PO) Take by mouth      methylcellulose (CITRUCEL) 500 MG TABS Take by mouth      Probiotic Product (PROBIOTIC DAILY PO) Take by mouth      loperamide (IMODIUM) 2 MG capsule Take 2 mg by mouth 4 times daily as needed for Diarrhea      escitalopram (LEXAPRO) 10 MG tablet 0.5 tab daily x 30 days then 0.5 tab every other day for a month then off. 90 tablet 2    dicyclomine (BENTYL) 10 MG capsule Take 1 capsule by mouth 4 times daily as needed (diarrhea, IBS) 120 capsule 5    Multiple Vitamins-Minerals (THERAPEUTIC MULTIVITAMIN-MINERALS) tablet Take 1 tablet by mouth daily      ascorbic acid (V-R VITAMIN C) 250 MG tablet Take 1 tablet by mouth daily (Patient not taking: Reported on 7/12/2021) 30 tablet 3     No current facility-administered medications for this visit. Allergies   Allergen Reactions    Bactrim [Sulfamethoxazole-Trimethoprim] Hives and Rash       Health Maintenance   Topic Date Due    Hepatitis C screen  Never done    HIV screen  Never done    DTaP/Tdap/Td vaccine (1 - Tdap) Never done    Lipid screen  Never done    Diabetes screen  Never done    Colon cancer screen colonoscopy  Never done    Shingles Vaccine (1 of 2) Never done    Flu vaccine (1) 09/01/2021    Breast cancer screen  03/30/2023    COVID-19 Vaccine  Completed    Hepatitis A vaccine  Aged Out    Hepatitis B vaccine  Aged Out    Hib vaccine  Aged Out    Meningococcal (ACWY) vaccine  Aged Out    Pneumococcal 0-64 years Vaccine  Aged Out       Subjective:      Review of Systems  Hysterectomy   Still has ovaries. Hot flashes     Objective:     Vitals:    07/12/21 1528   BP: 116/78   Pulse: 92   SpO2: 98%   Weight: 207 lb 3.2 oz (94 kg)     Physical Exam  Vitals and nursing note reviewed. Constitutional:       General: She is not in acute distress. Appearance: She is well-developed. She is not diaphoretic. HENT:      Head: Normocephalic and atraumatic. Right Ear: External ear normal. There is impacted cerumen. Left Ear: Tympanic membrane, ear canal and external ear normal.      Mouth/Throat:      Mouth: Mucous membranes are moist.      Pharynx: No oropharyngeal exudate. Eyes:      General:         Right eye: No discharge. Left eye: No discharge.       Conjunctiva/sclera: Conjunctivae normal.      Pupils: Pupils are equal, round, and reactive to light. Neck:      Thyroid: No thyromegaly. Trachea: No tracheal deviation. Cardiovascular:      Rate and Rhythm: Normal rate and regular rhythm. Heart sounds: Normal heart sounds. No murmur heard. Comments: Varicose veins both legs  Pulmonary:      Effort: Pulmonary effort is normal. No respiratory distress. Breath sounds: Normal breath sounds. No wheezing. Abdominal:      General: Bowel sounds are normal. There is no distension. Palpations: Abdomen is soft. Tenderness: There is no abdominal tenderness. Musculoskeletal:      Right lower leg: No edema. Left lower leg: No edema. Lymphadenopathy:      Cervical: No cervical adenopathy. Skin:     General: Skin is warm and dry. Findings: No erythema. Neurological:      Mental Status: She is alert and oriented to person, place, and time. Cranial Nerves: No cranial nerve deficit. Psychiatric:         Mood and Affect: Mood normal.         Behavior: Behavior normal.         Thought Content: Thought content normal.         Assessment:      Diagnosis Orders   1. Wellness examination     2. Depression with anxiety  escitalopram (LEXAPRO) 10 MG tablet    DISCONTINUED: escitalopram (LEXAPRO) 10 MG tablet   3. Irritable bowel syndrome with diarrhea  dicyclomine (BENTYL) 10 MG capsule         Plan:      Return in about 1 year (around 2022) for check up. immunization info given    Try cutting down lexapro to 5 mg daily x 30 days then 5 mg every other day for 30 days and off. No orders of the defined types were placed in this encounter. Orders Placed This Encounter   Medications    DISCONTD: escitalopram (LEXAPRO) 10 MG tablet     Sig: Take 0.5 tablets by mouth daily     Dispense:  90 tablet     Refill:  2    escitalopram (LEXAPRO) 10 MG tablet     Si.5 tab daily x 30 days then 0.5 tab every other day for a month then off.      Dispense:  90 tablet     Refill:  2    dicyclomine (BENTYL) 10 MG capsule     Sig: Take 1 capsule by mouth 4 times daily as needed (diarrhea, IBS)     Dispense:  120 capsule     Refill:  5       Patient given educational materials - see patient instructions. Discussed use,benefit, and side effects of prescribed medications. All patient questions answered. Pt voiced understanding. Reviewed health maintenance. Instructed to continue currentmedications, healthy diet and regular, aerobic exercise.             Electronically signed by Clarice Prater MD on 7/12/21 at 3:31 PM EDT

## 2021-07-20 ENCOUNTER — HOSPITAL ENCOUNTER (OUTPATIENT)
Age: 56
Setting detail: SPECIMEN
Discharge: HOME | End: 2021-07-20
Payer: COMMERCIAL

## 2021-07-20 DIAGNOSIS — Z13.220 SCREENING CHOLESTEROL LEVEL: ICD-10-CM

## 2021-07-20 DIAGNOSIS — Z13.1 DIABETES MELLITUS SCREENING: ICD-10-CM

## 2021-07-20 LAB
CHOLESTEROL/HDL RATIO: 3.2
CHOLESTEROL: 174 MG/DL
GLUCOSE FASTING: 96 MG/DL (ref 70–99)
HDLC SERPL-MCNC: 55 MG/DL
LDL CHOLESTEROL: 93 MG/DL (ref 0–130)
TRIGL SERPL-MCNC: 128 MG/DL
VLDLC SERPL CALC-MCNC: NORMAL MG/DL (ref 1–30)

## 2021-09-08 ENCOUNTER — PATIENT MESSAGE (OUTPATIENT)
Dept: PRIMARY CARE CLINIC | Age: 56
End: 2021-09-08

## 2021-09-08 DIAGNOSIS — R30.0 DYSURIA: Primary | ICD-10-CM

## 2021-09-14 ENCOUNTER — HOSPITAL ENCOUNTER (OUTPATIENT)
Age: 56
Discharge: HOME | End: 2021-09-14
Payer: COMMERCIAL

## 2021-09-14 DIAGNOSIS — R30.0 DYSURIA: ICD-10-CM

## 2021-09-14 LAB
-: ABNORMAL
AMORPHOUS: ABNORMAL
BACTERIA: ABNORMAL
BILIRUBIN URINE: NEGATIVE
CASTS UA: ABNORMAL /LPF (ref 0–2)
COLOR: YELLOW
CRYSTALS, UA: ABNORMAL /HPF
CRYSTALS, UA: ABNORMAL /HPF
EPITHELIAL CELLS UA: ABNORMAL /HPF (ref 0–5)
GLUCOSE URINE: NEGATIVE
KETONES, URINE: NEGATIVE
LEUKOCYTE ESTERASE, URINE: ABNORMAL
MUCUS: ABNORMAL
NITRITE, URINE: NEGATIVE
OTHER OBSERVATIONS UA: ABNORMAL
PH UA: 5 (ref 5–8)
PROTEIN UA: NEGATIVE
RBC UA: ABNORMAL /HPF (ref 0–2)
RENAL EPITHELIAL, UA: ABNORMAL /HPF
SPECIFIC GRAVITY UA: 1.03 (ref 1–1.03)
TRICHOMONAS: ABNORMAL
TURBIDITY: ABNORMAL
URINE HGB: ABNORMAL
UROBILINOGEN, URINE: NORMAL
WBC UA: ABNORMAL /HPF (ref 0–5)
YEAST: ABNORMAL

## 2021-09-14 PROCEDURE — 87086 URINE CULTURE/COLONY COUNT: CPT

## 2021-09-14 PROCEDURE — 81001 URINALYSIS AUTO W/SCOPE: CPT

## 2021-09-14 PROCEDURE — 87186 SC STD MICRODIL/AGAR DIL: CPT

## 2021-09-14 PROCEDURE — 87077 CULTURE AEROBIC IDENTIFY: CPT

## 2021-09-18 LAB
CULTURE: ABNORMAL
Lab: ABNORMAL
SPECIMEN DESCRIPTION: ABNORMAL

## 2021-09-20 DIAGNOSIS — N39.0 URINARY TRACT INFECTION DUE TO ENTEROCOCCUS: Primary | ICD-10-CM

## 2021-09-20 DIAGNOSIS — B95.2 URINARY TRACT INFECTION DUE TO ENTEROCOCCUS: Primary | ICD-10-CM

## 2021-09-20 RX ORDER — CIPROFLOXACIN 500 MG/1
500 TABLET, FILM COATED ORAL 2 TIMES DAILY
Qty: 10 TABLET | Refills: 0 | Status: SHIPPED | OUTPATIENT
Start: 2021-09-20 | End: 2021-09-25

## 2022-01-03 ENCOUNTER — PATIENT MESSAGE (OUTPATIENT)
Dept: PRIMARY CARE CLINIC | Age: 57
End: 2022-01-03

## 2022-03-18 ENCOUNTER — OFFICE VISIT (OUTPATIENT)
Dept: PRIMARY CARE CLINIC | Age: 57
End: 2022-03-18
Payer: COMMERCIAL

## 2022-03-18 VITALS
HEIGHT: 66 IN | DIASTOLIC BLOOD PRESSURE: 74 MMHG | WEIGHT: 203.2 LBS | OXYGEN SATURATION: 99 % | SYSTOLIC BLOOD PRESSURE: 114 MMHG | HEART RATE: 87 BPM | BODY MASS INDEX: 32.66 KG/M2

## 2022-03-18 DIAGNOSIS — M70.62 TROCHANTERIC BURSITIS OF BOTH HIPS: Primary | ICD-10-CM

## 2022-03-18 DIAGNOSIS — M70.61 TROCHANTERIC BURSITIS OF BOTH HIPS: Primary | ICD-10-CM

## 2022-03-18 DIAGNOSIS — M76.899 HIP FLEXOR TENDONITIS, UNSPECIFIED LATERALITY: ICD-10-CM

## 2022-03-18 DIAGNOSIS — M89.8X8 STERNAL MASS: ICD-10-CM

## 2022-03-18 PROCEDURE — 3017F COLORECTAL CA SCREEN DOC REV: CPT | Performed by: FAMILY MEDICINE

## 2022-03-18 PROCEDURE — G8484 FLU IMMUNIZE NO ADMIN: HCPCS | Performed by: FAMILY MEDICINE

## 2022-03-18 PROCEDURE — 1036F TOBACCO NON-USER: CPT | Performed by: FAMILY MEDICINE

## 2022-03-18 PROCEDURE — 99213 OFFICE O/P EST LOW 20 MIN: CPT | Performed by: FAMILY MEDICINE

## 2022-03-18 PROCEDURE — G8427 DOCREV CUR MEDS BY ELIG CLIN: HCPCS | Performed by: FAMILY MEDICINE

## 2022-03-18 PROCEDURE — G8417 CALC BMI ABV UP PARAM F/U: HCPCS | Performed by: FAMILY MEDICINE

## 2022-03-18 RX ORDER — CALCIUM CARBONATE 260MG(650)
TABLET,CHEWABLE ORAL
COMMUNITY
End: 2022-03-18 | Stop reason: ALTCHOICE

## 2022-03-18 ASSESSMENT — ENCOUNTER SYMPTOMS: BACK PAIN: 1

## 2022-03-18 NOTE — PATIENT INSTRUCTIONS
Patient Education        Hip Bursitis: Exercises  Introduction  Here are some examples of exercises for you to try. The exercises may be suggested for a condition or for rehabilitation. Start each exercise slowly. Ease off the exercises if you start to have pain. You will be told when to start these exercises and which ones will work best for you. How to do the exercises  Hip rotator stretch    1. Lie on your back with both knees bent and your feet flat on the floor. 2. Put the ankle of your affected leg on your opposite thigh near your knee. 3. Use your hand to gently push your knee away from your body until you feel a gentle stretch around your hip. 4. Hold the stretch for 15 to 30 seconds. 5. Repeat 2 to 4 times. 6. Repeat steps 1 through 5, but this time use your hand to gently pull your knee toward your opposite shoulder. Iliotibial band stretch    1. Lean sideways against a wall. If you are not steady on your feet, hold on to a chair or counter. 2. Stand on the leg with the affected hip, with that leg close to the wall. Then cross your other leg in front of it. 3. Let your affected hip drop out to the side of your body and against wall. Then lean away from your affected hip until you feel a stretch. 4. Hold the stretch for 15 to 30 seconds. 5. Repeat 2 to 4 times. Straight-leg raises to the outside    1. Lie on your side, with your affected hip on top. 2. Tighten the front thigh muscles of your top leg to keep your knee straight. 3. Keep your hip and your leg straight in line with the rest of your body, and keep your knee pointing forward. Do not drop your hip back. 4. Lift your top leg straight up toward the ceiling, about 12 inches off the floor. Hold for about 6 seconds, then slowly lower your leg. 5. Repeat 8 to 12 times. Clamshell    1. Lie on your side, with your affected hip on top and your head propped on a pillow. Keep your feet and knees together and your knees bent.   2. Raise your top knee, but keep your feet together. Do not let your hips roll back. Your legs should open up like a clamshell. 3. Hold for 6 seconds. 4. Slowly lower your knee back down. Rest for 10 seconds. 5. Repeat 8 to 12 times. Follow-up care is a key part of your treatment and safety. Be sure to make and go to all appointments, and call your doctor if you are having problems. It's also a good idea to know your test results and keep a list of the medicines you take. Where can you learn more? Go to https://ForsevapeGuided Interventions.Honesty Online. org and sign in to your [employer] account. Enter F934 in the [facility] box to learn more about \"Hip Bursitis: Exercises. \"     If you do not have an account, please click on the \"Sign Up Now\" link. Current as of: July 1, 2021               Content Version: 13.1  © 2006-2021 [employer]. Care instructions adapted under license by [facility] ([facility]). If you have questions about a medical condition or this instruction, always ask your healthcare professional. [place] [address] any warranty or liability for your use of this information. Patient Education        Trochanteric Bursitis: Exercises  Introduction  Here are some examples of exercises for you to try. The exercises may be suggested for a condition or for rehabilitation. Start each exercise slowly. Ease off the exercises if you start to have pain. You will be told when to start these exercises and which ones will work best for you. How to do the exercises  Hamstring wall stretch    1. Lie on your back in a doorway, with your good leg through the open door. 2. Slide your affected leg up the wall to straighten your knee. You should feel a gentle stretch down the back of your leg. 3. Hold the stretch for at least 1 minute to begin. Then try to lengthen the time you hold the stretch to as long as 6 minutes. 4. Repeat 2 to 4 times.   5. If you do not have a place to do this exercise in a doorway, there is another way to do it:  6. Lie on your back, and bend the knee of your affected leg. 7. Loop a towel under the ball and toes of that foot, and hold the ends of the towel in your hands. 8. Straighten your knee, and slowly pull back on the towel. You should feel a gentle stretch down the back of your leg. 9. Hold the stretch for 15 to 30 seconds. Or even better, hold the stretch for 1 minute if you can. 10. Repeat 2 to 4 times. 1. Do not arch your back. 2. Do not bend either knee. 3. Keep one heel touching the floor and the other heel touching the wall. Do not point your toes. Straight-leg raises to the outside    1. Lie on your side, with your affected leg on top. 2. Tighten the front thigh muscles of your top leg to keep your knee straight. 3. Keep your hip and your leg straight in line with the rest of your body, and keep your knee pointing forward. Do not drop your hip back. 4. Lift your top leg straight up toward the ceiling, about 12 inches off the floor. Hold for about 6 seconds, then slowly lower your leg. 5. Repeat 8 to 12 times. Clamshell    1. Lie on your side, with your affected leg on top and your head propped on a pillow. Keep your feet and knees together and your knees bent. 2. Raise your top knee, but keep your feet together. Do not let your hips roll back. Your legs should open up like a clamshell. 3. Hold for 6 seconds. 4. Slowly lower your knee back down. Rest for 10 seconds. 5. Repeat 8 to 12 times. Standing quadriceps stretch    1. If you are not steady on your feet, hold on to a chair, counter, or wall. You can also lie on your stomach or your side to do this exercise. 2. Bend the knee of the leg you want to stretch, and reach behind you to grab the front of your foot or ankle with the hand on the same side. For example, if you are stretching your right leg, use your right hand.   3. Keeping your knees next to each other, pull your foot toward your buttock until you feel a gentle stretch across the front of your hip and down the front of your thigh. Your knee should be pointed directly to the ground, and not out to the side. 4. Hold the stretch for 15 to 30 seconds. 5. Repeat 2 to 4 times. Piriformis stretch    1. Lie on your back with your legs straight. 2. Lift your affected leg and bend your knee. With your opposite hand, reach across your body, and then gently pull your knee toward your opposite shoulder. 3. Hold the stretch for 15 to 30 seconds. 4. Repeat 2 to 4 times. Double knee-to-chest    1. Lie on your back with your knees bent and your feet flat on the floor. You can put a small pillow under your head and neck if it is more comfortable. 2. Bring both knees to your chest.  3. Keep your lower back pressed to the floor. Hold for 15 to 30 seconds. 4. Relax, and lower your knees to the starting position. 5. Repeat 2 to 4 times. Follow-up care is a key part of your treatment and safety. Be sure to make and go to all appointments, and call your doctor if you are having problems. It's also a good idea to know your test results and keep a list of the medicines you take. Where can you learn more? Go to https://Literably.Cinexio. org and sign in to your [employer] account. Enter S606 in the [facility] box to learn more about \"Trochanteric Bursitis: Exercises. \"     If you do not have an account, please click on the \"Sign Up Now\" link. Current as of: July 1, 2021               Content Version: 13.1  © 2006-2021 Healthwise, Incorporated. Care instructions adapted under license by [facility] ([facility]). If you have questions about a medical condition or this instruction, always ask your healthcare professional. [place] [address] any warranty or liability for your use of this information. Patient Education        Hip Flexor Strain: Rehab Exercises  Introduction  Here are some examples of exercises for you to try.  The exercises may be suggested for a condition or for rehabilitation. Start each exercise slowly. Ease off the exercises if you start to have pain. You will be told when to start these exercises and which ones will work best for you. How to do the exercises  Pelvic tilt with marching    1. Lie on your back with your knees bent and your feet flat on the floor. 2. Tighten your belly muscles and buttocks, and press your lower back to the floor. 3. Keeping your knees bent, lift and then lower one leg up off the floor, and then lift and lower your other leg like you are marching. Each time you lift your leg, hold that position for about 6 seconds before lowering your leg. 4. Repeat 8 to 12 times. Scissors    1. Lie on your back with your knees bent at a 90-degree angle and your feet off the floor. 2. Tighten your belly muscles and buttocks, and press your lower back to the floor. 3. Slowly straighten one leg, and hold that position for about 6 seconds. Your leg should be about 12 inches off the floor. Bring that leg back to the starting position, and then straighten your other leg. Hold that position for about 6 seconds, and then switch legs again. 4. Repeat 8 to 12 times. Hamstring stretch (lying down)    1. Lie flat on your back with your legs straight. If you feel discomfort in your back, place a small towel roll under your lower back. 2. Holding the back of your affected leg for support, lift your leg straight up and toward your body until you feel a stretch at the back of your thigh. 3. Hold the stretch for at least 30 seconds. 4. Repeat 2 to 4 times. Quadricep and hip flexor stretch (lying on side)    1. Lie on your side with your good leg flat on the floor and your hand supporting your head. 2. Bend your top leg, and reach behind you to grab the front of that foot or ankle with your other hand. 3. Stretch your leg back by pulling your foot toward your buttock.  You will feel the stretch in the front of your thigh. If this causes stress on your knee, do not do this stretch. 4. Hold the stretch for at least 15 to 30 seconds. 5. Repeat 2 to 4 times. Hip flexor stretch (kneeling)    1. Kneel on your affected leg and bend your good leg out in front of you, with that foot flat on the floor. If you feel discomfort in the front of your knee, place a towel under your knee. 2. Keeping your back straight, slowly push your hips forward until you feel a stretch in the upper thigh of your back leg and hip. 3. Hold the stretch for at least 15 to 30 seconds. 4. Repeat 2 to 4 times. Hip flexor stretch (edge of table)    1. Lie flat on your back on a table or flat bench, with your knees and lower legs hanging off the edge of the table. 2. Grab your good leg at the knee, and pull that knee back toward your chest. Relax your affected leg and let it hang down toward the floor until you feel a stretch in the upper thigh of your affected leg and hip. 3. Hold the stretch for at least 15 to 30 seconds. 4. Repeat 2 to 4 times. Follow-up care is a key part of your treatment and safety. Be sure to make and go to all appointments, and call your doctor if you are having problems. It's also a good idea to know your test results and keep a list of the medicines you take. Where can you learn more? Go to https://EVIAGENICSpePinterest.[employer]ima. org and sign in to your [employer] account. Enter B303 in the [facility] box to learn more about \"Hip Flexor Strain: Rehab Exercises. \"     If you do not have an account, please click on the \"Sign Up Now\" link. Current as of: July 1, 2021               Content Version: 13.1  © 8720-9911 Healthwise, Incorporated. Care instructions adapted under license by [facility] ([facility]). If you have questions about a medical condition or this instruction, always ask your healthcare professional. [address] any warranty or liability for your use of this information.

## 2022-03-18 NOTE — PROGRESS NOTES
[address] [address]  Dept: [phone]    Twan Brunner is a 61 y.o. female Established patient, who presents today for her medical conditions/complaintsas noted below. Chief Complaint   Patient presents with    Mass     Pt has a lump above her breastbone in the middle.  Leg Pain     Pt c/o bilateral leg pain at night time. HPI:     HPI   lump above breastbone: in the past week,   Her wife noticed it. Post covid leg pain : aches from hips down lateral hips down lateral legs to the ankles, she thinks it's on the inside legs also. Likes to sleep on her sides. Covid was in January    support hose help during the day.    Reviewed prior notes None  Reviewed previous Labs and Imaging   IBS with diarrhea still    LDL Cholesterol (mg/dL)   Date Value   2021 93       (goal LDL is <100)   No results found for: AST, ALT, BUN, LABA1C, TSH  BP Readings from Last 3 Encounters:   22 114/74   21 116/78   21 122/80          (goal 120/80)    Past Medical History:   Diagnosis Date    Anxiety     Asthma     Former     Depression     Minor     Sleep apnea     dont use a machine ( lost weight )      Past Surgical History:   Procedure Laterality Date    GALLBLADDER SURGERY      HYSTERECTOMY, VAGINAL      OVARIES ONLY        Family History   Problem Relation Age of Onset    Arrhythmia Mother     Cancer Mother     Depression Mother     High Cholesterol Mother     Diabetes Mother    [de-identified] / [place] Mother     Other Mother     Arrhythmia Father     Heart Disease Father     High Cholesterol Father     Other Maternal Grandmother        Social History     Tobacco Use    Smoking status: Former Smoker     Packs/day: 1.50     Years: 3.00     Pack years: 4.50     Types: Cigarettes     Quit date: 1998     Years since quittin.3    Smokeless tobacco: Never Used   Substance Use Topics  Alcohol use: Yes     Comment: Once in awhile       Current Outpatient Medications   Medication Sig Dispense Refill    Cholecalciferol (VITAMIN D3 PO) Take by mouth      methylcellulose (CITRUCEL) 500 MG TABS Take by mouth      loperamide (IMODIUM) 2 MG capsule Take 2 mg by mouth 4 times daily as needed for Diarrhea      Multiple Vitamins-Minerals (THERAPEUTIC MULTIVITAMIN-MINERALS) tablet Take 1 tablet by mouth daily       No current facility-administered medications for this visit. Allergies   Allergen Reactions    Pcn [Penicillins] Diarrhea    Bactrim [Sulfamethoxazole-Trimethoprim] Hives and Rash       Health Maintenance   Topic Date Due    Hepatitis C screen  Never done    HIV screen  Never done    DTaP/Tdap/Td vaccine (1 - Tdap) Never done    Shingles Vaccine (1 of 2) Never done    Flu vaccine (1) 09/01/2021    Depression Monitoring  03/27/2022    Breast cancer screen  03/30/2023    Diabetes screen  07/20/2024    Colorectal Cancer Screen  10/24/2024    Lipid screen  07/20/2026    COVID-19 Vaccine  Completed    Hepatitis A vaccine  Aged Out    Hepatitis B vaccine  Aged Out    Hib vaccine  Aged Out    Meningococcal (ACWY) vaccine  Aged Out    Pneumococcal 0-64 years Vaccine  Aged Out       Subjective:      Review of Systems   Musculoskeletal: Positive for back pain. Left leg numbness off and on from previous back issue. X years       Objective:     /74   Pulse 87   Ht 5' 6\" (1.676 m)   Wt 203 lb 3.2 oz (92.2 kg)   SpO2 99%   BMI 32.80 kg/m²   Physical Exam  Vitals and nursing note reviewed. Constitutional:       Appearance: Normal appearance. HENT:      Head: Normocephalic and atraumatic. Eyes:      Conjunctiva/sclera: Conjunctivae normal.   Cardiovascular:      Rate and Rhythm: Normal rate. Pulses:           Femoral pulses are 2+ on the right side and 2+ on the left side. Popliteal pulses are 2+ on the right side and 2+ on the left side. Dorsalis pedis pulses are 2+ on the right side and 2+ on the left side. Posterior tibial pulses are 1+ on the right side and 1+ on the left side. Pulmonary:      Effort: No respiratory distress. Chest:      Comments: Upper sternum: fatty type tissue about 3 cm, no masses felt in sternum. No masses over Clavicular-sternal jt  Musculoskeletal:         General: Tenderness present. No swelling. Comments: Tender trochanteric areas bilaterally. Hip flexors did make a snapping sound with hip flexion and extension. More so on the left than the right. Hips range of motion is within normal limits. Nontender SI joints and ischium areas bilaterally. Skin:     General: Skin is warm. Findings: Lesion present. Comments: Multiple keratoses and moles on the back. Neurological:      Mental Status: She is alert and oriented to person, place, and time. Psychiatric:         Mood and Affect: Mood normal.         Behavior: Behavior normal.         Thought Content: Thought content normal.         Assessment:       Diagnosis Orders   1. Trochanteric bursitis of both hips     2. Hip flexor tendonitis, unspecified laterality     3. Sternal mass  XR STERNUM (MIN 2 VIEWS)        Plan:      No follow-ups on file. Home stretches and if not better see PT. She should come in for total body skin check with Mayra Israel  Orders Placed This Encounter   Procedures    XR STERNUM (MIN 2 VIEWS)     Standing Status:   Future     Standing Expiration Date:   3/18/2023     No orders of the defined types were placed in this encounter. Patient given educationalmaterials - see patient instructions. Discussed use, benefit, and side effectsof prescribed medications. All patient questions answered. Pt voiced understanding. Reviewed health maintenance. Instructed to continue current medications, diet andexercise. Patient agreed with treatment plan. Follow up as directed.      Electronicallysigned by Yamel Tello MD on 3/18/2022 at 2:11 PM

## 2022-05-28 ENCOUNTER — HOSPITAL ENCOUNTER (OUTPATIENT)
Dept: GENERAL RADIOLOGY | Age: 57
Discharge: HOME | End: 2022-05-30
Payer: COMMERCIAL

## 2022-05-28 ENCOUNTER — HOSPITAL ENCOUNTER (OUTPATIENT)
Age: 57
Discharge: HOME | End: 2022-05-30
Payer: COMMERCIAL

## 2022-05-28 DIAGNOSIS — M89.8X8 STERNAL MASS: ICD-10-CM

## 2022-05-28 PROCEDURE — 71120 X-RAY EXAM BREASTBONE 2/>VWS: CPT

## 2022-07-11 ENCOUNTER — OFFICE VISIT (OUTPATIENT)
Dept: PRIMARY CARE CLINIC | Age: 57
End: 2022-07-11
Payer: COMMERCIAL

## 2022-07-11 VITALS
BODY MASS INDEX: 31.92 KG/M2 | WEIGHT: 198.6 LBS | OXYGEN SATURATION: 97 % | HEART RATE: 84 BPM | SYSTOLIC BLOOD PRESSURE: 126 MMHG | DIASTOLIC BLOOD PRESSURE: 84 MMHG | HEIGHT: 66 IN

## 2022-07-11 DIAGNOSIS — Z00.00 ENCOUNTER FOR WELL ADULT EXAM WITHOUT ABNORMAL FINDINGS: Primary | ICD-10-CM

## 2022-07-11 DIAGNOSIS — Z00.00 ENCOUNTER FOR WELL ADULT EXAM WITHOUT ABNORMAL FINDINGS: ICD-10-CM

## 2022-07-11 LAB
CHOLESTEROL/HDL RATIO: 3.6
CHOLESTEROL: 182 MG/DL
GLUCOSE FASTING: 93 MG/DL (ref 70–99)
HDLC SERPL-MCNC: 50 MG/DL
LDL CHOLESTEROL: 104 MG/DL (ref 0–130)
TRIGL SERPL-MCNC: 141 MG/DL

## 2022-07-11 PROCEDURE — 99396 PREV VISIT EST AGE 40-64: CPT | Performed by: FAMILY MEDICINE

## 2022-07-11 SDOH — ECONOMIC STABILITY: FOOD INSECURITY: WITHIN THE PAST 12 MONTHS, YOU WORRIED THAT YOUR FOOD WOULD RUN OUT BEFORE YOU GOT MONEY TO BUY MORE.: NEVER TRUE

## 2022-07-11 SDOH — ECONOMIC STABILITY: FOOD INSECURITY: WITHIN THE PAST 12 MONTHS, THE FOOD YOU BOUGHT JUST DIDN'T LAST AND YOU DIDN'T HAVE MONEY TO GET MORE.: NEVER TRUE

## 2022-07-11 ASSESSMENT — PATIENT HEALTH QUESTIONNAIRE - PHQ9
10. IF YOU CHECKED OFF ANY PROBLEMS, HOW DIFFICULT HAVE THESE PROBLEMS MADE IT FOR YOU TO DO YOUR WORK, TAKE CARE OF THINGS AT HOME, OR GET ALONG WITH OTHER PEOPLE: 0
SUM OF ALL RESPONSES TO PHQ QUESTIONS 1-9: 0
5. POOR APPETITE OR OVEREATING: 0
6. FEELING BAD ABOUT YOURSELF - OR THAT YOU ARE A FAILURE OR HAVE LET YOURSELF OR YOUR FAMILY DOWN: 0
9. THOUGHTS THAT YOU WOULD BE BETTER OFF DEAD, OR OF HURTING YOURSELF: 0
1. LITTLE INTEREST OR PLEASURE IN DOING THINGS: 0
SUM OF ALL RESPONSES TO PHQ QUESTIONS 1-9: 0
SUM OF ALL RESPONSES TO PHQ9 QUESTIONS 1 & 2: 0
2. FEELING DOWN, DEPRESSED OR HOPELESS: 0
3. TROUBLE FALLING OR STAYING ASLEEP: 0
8. MOVING OR SPEAKING SO SLOWLY THAT OTHER PEOPLE COULD HAVE NOTICED. OR THE OPPOSITE, BEING SO FIGETY OR RESTLESS THAT YOU HAVE BEEN MOVING AROUND A LOT MORE THAN USUAL: 0
7. TROUBLE CONCENTRATING ON THINGS, SUCH AS READING THE NEWSPAPER OR WATCHING TELEVISION: 0
4. FEELING TIRED OR HAVING LITTLE ENERGY: 0

## 2022-07-11 ASSESSMENT — ENCOUNTER SYMPTOMS: RESPIRATORY NEGATIVE: 1

## 2022-07-11 ASSESSMENT — SOCIAL DETERMINANTS OF HEALTH (SDOH): HOW HARD IS IT FOR YOU TO PAY FOR THE VERY BASICS LIKE FOOD, HOUSING, MEDICAL CARE, AND HEATING?: NOT HARD AT ALL

## 2022-07-11 NOTE — PATIENT INSTRUCTIONS
Well Visit, Women 48 to 72: Care Instructions  Overview     Well visits can help you stay healthy. Your doctor has checked your overall health and may have suggested ways to take good care of yourself. Your doctor also may have recommended tests. At home, you can help prevent illness withhealthy eating, regular exercise, and other steps. Follow-up care is a key part of your treatment and safety. Be sure to make and go to all appointments, and call your doctor if you are having problems. It's also a good idea to know your test results and keep alist of the medicines you take. How can you care for yourself at home?  Get screening tests that you and your doctor decide on. Screening helps find diseases before any symptoms appear.  Eat healthy foods. Choose fruits, vegetables, whole grains, protein, and low-fat dairy foods. Limit fat, especially saturated fat. Reduce salt in your diet.  Limit alcohol. Have no more than 1 drink a day or 7 drinks a week.  Get at least 30 minutes of exercise on most days of the week. Walking is a good choice. You also may want to do other activities, such as running, swimming, cycling, or playing tennis or team sports.  Reach and stay at a healthy weight. This will lower your risk for many problems, such as obesity, diabetes, heart disease, and high blood pressure.  Do not smoke. Smoking can make health problems worse. If you need help quitting, talk to your doctor about stop-smoking programs and medicines. These can increase your chances of quitting for good.  Care for your mental health. It is easy to get weighed down by worry and stress. Learn strategies to manage stress, like deep breathing and mindfulness, and stay connected with your family and community. If you find you often feel sad or hopeless, talk with your doctor. Treatment can help.  Talk to your doctor about whether you have any risk factors for sexually transmitted infections (STIs).  You can help prevent STIs if you wait to have sex with a new partner (or partners) until you've each been tested for STIs. It also helps if you use condoms (male or female condoms) and if you limit your sex partners to one person who only has sex with you. Vaccines are available for some STIs.  If you think you may have a problem with alcohol or drug use, talk to your doctor. This includes prescription medicines (such as amphetamines and opioids) and illegal drugs (such as cocaine and methamphetamine). Your doctor can help you figure out what type of treatment is best for you.  Protect your skin from too much sun. When you're outdoors from 10 a.m. to 4 p.m., stay in the shade or cover up with clothing and a hat with a wide brim. Wear sunglasses that block UV rays. Even when it's cloudy, put broad-spectrum sunscreen (SPF 30 or higher) on any exposed skin.  See a dentist one or two times a year for checkups and to have your teeth cleaned.  Wear a seat belt in the car. When should you call for help? Watch closely for changes in your health, and be sure to contact your doctor if you have any problems or symptoms that concern you. Where can you learn more? Go to https://chpepiceweb.health-partners. org and sign in to your [employer] account. Enter S166 in the [facility] box to learn more about \"Well Visit, Women 50 to 72: Care Instructions. \"     If you do not have an account, please click on the \"Sign Up Now\" link. Current as of: October 6, 2021               Content Version: 13.3  © 2006-2022 Healthwise, Incorporated. Care instructions adapted under license by [facility] [place] ([facility]). If you have questions about a medical condition or this instruction, always ask your healthcare professional. [place] [address] any warranty or liability for your use of this information.            A Healthy Lifestyle: Care Instructions  Your Care Instructions     A healthy lifestyle can help you feel good, stay at a healthy weight, and have plenty of energy for both work and play. A healthy lifestyle is something youcan share with your whole family. A healthy lifestyle also can lower your risk for serious health problems, suchas high blood pressure, heart disease, and diabetes. You can follow a few steps listed below to improve your health and the healthof your family. Follow-up care is a key part of your treatment and safety. Be sure to make and go to all appointments, and call your doctor if you are having problems. It's also a good idea to know your test results and keep alist of the medicines you take. How can you care for yourself at home?  Do not eat too much sugar, fat, or fast foods. You can still have dessert and treats now and then. The goal is moderation.  Start small to improve your eating habits. Pay attention to portion sizes, drink less juice and soda pop, and eat more fruits and vegetables. ? Eat a healthy amount of food. A 3-ounce serving of meat, for example, is about the size of a deck of cards. Fill the rest of your plate with vegetables and whole grains. ? Limit the amount of soda and sports drinks you have every day. Drink more water when you are thirsty. ? Eat plenty of fruits and vegetables every day. Have an apple or some carrot sticks as an afternoon snack instead of a candy bar. Try to have fruits and/or vegetables at every meal.   Make exercise part of your daily routine. You may want to start with simple activities, such as walking, bicycling, or slow swimming. Try to be active 30 to 60 minutes every day. You do not need to do all 30 to 60 minutes all at once. For example, you can exercise 3 times a day for 10 or 20 minutes. Moderate exercise is safe for most people, but it is always a good idea to talk to your doctor before starting an exercise program.   Keep moving. [place] the lawn, work in the garden, or [employer]. Take the stairs instead of the elevator at work.    If you smoke, quit. People who smoke have an increased risk for heart attack, stroke, cancer, and other lung illnesses. Quitting is hard, but there are ways to boost your chance of quitting tobacco for good. ? Use nicotine gum, patches, or lozenges. ? Ask your doctor about stop-smoking programs and medicines. ? Keep trying. In addition to reducing your risk of diseases in the future, you will notice some benefits soon after you stop using tobacco. If you have shortness of breath or asthma symptoms, they will likely get better within a few weeks after you quit.  Limit how much alcohol you drink. Moderate amounts of alcohol (up to 2 drinks a day for men, 1 drink a day for women) are okay. But drinking too much can lead to liver problems, high blood pressure, and other health problems. Family health  If you have a family, there are many things you can do together to improve yourhealth.  Eat meals together as a family as often as possible.  Eat healthy foods. This includes fruits, vegetables, lean meats and dairy, and whole grains.  Include your family in your fitness plan. Most people think of activities such as jogging or tennis as the way to fitness, but there are many ways you and your family can be more active. Anything that makes you breathe hard and gets your heart pumping is exercise. Here are some tips:  ? Walk to do errands or to take your child to school or the bus.  ? Go for a family bike ride after dinner instead of watching TV. Where can you learn more? Go to https://Bolt HRrosina.[email or web address] org and sign in to your [employer] account. Enter Q964 in the [facility] box to learn more about \"A Healthy Lifestyle: Care Instructions. \"     If you do not have an account, please click on the \"Sign Up Now\" link. Current as of: February 9, 2022               Content Version: 13.3  © 8243-4673 Healthwise, Incorporated. Care instructions adapted under license by [facility] ([facility]).  If you have questions about a medical condition or this instruction, always ask your healthcare professional. [address] any warranty or liability for your use of this information. Patient Education        A Healthy Lifestyle: Care Instructions  Your Care Instructions     A healthy lifestyle can help you feel good, stay at a healthy weight, and have plenty of energy for both work and play. A healthy lifestyle is something youcan share with your whole family. A healthy lifestyle also can lower your risk for serious health problems, suchas high blood pressure, heart disease, and diabetes. You can follow a few steps listed below to improve your health and the healthof your family. Follow-up care is a key part of your treatment and safety. Be sure to make and go to all appointments, and call your doctor if you are having problems. It's also a good idea to know your test results and keep alist of the medicines you take. How can you care for yourself at home?  Do not eat too much sugar, fat, or fast foods. You can still have dessert and treats now and then. The goal is moderation.  Start small to improve your eating habits. Pay attention to portion sizes, drink less juice and soda pop, and eat more fruits and vegetables. ? Eat a healthy amount of food. A 3-ounce serving of meat, for example, is about the size of a deck of cards. Fill the rest of your plate with vegetables and whole grains. ? Limit the amount of soda and sports drinks you have every day. Drink more water when you are thirsty. ? Eat plenty of fruits and vegetables every day. Have an apple or some carrot sticks as an afternoon snack instead of a candy bar. Try to have fruits and/or vegetables at every meal.   Make exercise part of your daily routine. You may want to start with simple activities, such as walking, bicycling, or slow swimming. Try to be active 30 to 60 minutes every day.  You do not need to do all 30 to 60 minutes all at once. For example, you can exercise 3 times a day for 10 or 20 minutes. Moderate exercise is safe for most people, but it is always a good idea to talk to your doctor before starting an exercise program.   Keep moving. Zarina Mcd[place]ough[place] the lawn, work in the garden, or [place]. Take the stairs instead of the elevator at work.  If you smoke, quit. People who smoke have an increased risk for heart attack, stroke, cancer, and other lung illnesses. Quitting is hard, but there are ways to boost your chance of quitting tobacco for good. ? Use nicotine gum, patches, or lozenges. ? Ask your doctor about stop-smoking programs and medicines. ? Keep trying. In addition to reducing your risk of diseases in the future, you will notice some benefits soon after you stop using tobacco. If you have shortness of breath or asthma symptoms, they will likely get better within a few weeks after you quit.  Limit how much alcohol you drink. Moderate amounts of alcohol (up to 2 drinks a day for men, 1 drink a day for women) are okay. But drinking too much can lead to liver problems, high blood pressure, and other health problems. Family health  If you have a family, there are many things you can do together to improve yourhealth.  Eat meals together as a family as often as possible.  Eat healthy foods. This includes fruits, vegetables, lean meats and dairy, and whole grains.  Include your family in your fitness plan. Most people think of activities such as jogging or tennis as the way to fitness, but there are many ways you and your family can be more active. Anything that makes you breathe hard and gets your heart pumping is exercise. Here are some tips:  ? Walk to do errands or to take your child to school or the bus.  ? Go for a family bike ride after dinner instead of watching TV. Where can you learn more? Go to https://chnancieb.health-partners. org and sign in to your [employer] account.  Enter B850 in the [address] [facility] box to learn more about \"A Healthy Lifestyle: Care Instructions. \"     If you do not have an account, please click on the \"Sign Up Now\" link. Current as of: February 9, 2022               Content Version: 13.3  © 7273-5683 [employer]. Care instructions adapted under license by [facility] ([facility]). If you have questions about a medical condition or this instruction, always ask your healthcare professional. [place] [address] any warranty or liability for your use of this information. Patient Education        Recombinant Zoster (Shingles) Vaccine: What You Need to Know  Why get vaccinated? Recombinant zoster (shingles) vaccine can prevent shingles. Shingles (also called herpes zoster, or just zoster) is a painful skin rash, usually with blisters. In addition to the rash, shingles can cause fever, headache, chills, or upset stomach. Rarely, shingles can lead to complications such as pneumonia, hearing problems, blindness, brain inflammation (encephalitis), ordeath. The risk of shingles increases with age. The most common complication of shingles is long-term nerve pain called postherpetic neuralgia (PHN). PHN occurs in the areas where the shingles rash was and can last for months or years after the rash goes away. The pain from PHN can be severe anddebilitating. The risk of PHN increases with age. An older adult with shingles is more likely to develop PHN and have longer lasting and more severe pain than a youngerperson. People with weakened immune systems also have a higher risk of getting shinglesand complications from the disease. Shingles is caused by varicella-zoster virus, the same virus that causes chickenpox. After you have chickenpox, the virus stays in your body and can cause shingles later in life.  Shingles cannot be passed from one person to another, but the virus that causes shingles can spread and cause chickenpox insomeone who has never had chickenpox or has never received chickenpox vaccine. Recombinant shingles vaccine  Recombinant shingles vaccine provides strong protection against shingles. By preventing shingles, recombinant shingles vaccine also protects against PHN andother complications. Recombinant shingles vaccine is recommended for:   Adults 48 years and older   Adults 19 years and older who have a weakened immune system because of disease or treatments  Shingles vaccine is given as a two-dose series. For most people, the second dose should be given 2 to 6 months after the first dose. Some people who have or will have a weakened immune system can get the second dose 1 to 2 monthsafter the first dose. Ask your health care provider for guidance. People who have had shingles in the past and people who have received varicella (chickenpox) vaccine are recommended to get recombinant shingles vaccine. The vaccine is also recommended for people who have already gotten another type of shingles vaccine, the live shingles vaccine. There is no live virus inrecombinant shingles vaccine. Shingles vaccine may be given at the same time as other vaccines. Talk with your health care provider  Tell your vaccination provider if the person getting the vaccine:   Has had an allergic reaction after a previous dose of recombinant shingles vaccine, or has any severe, life-threatening allergies   Is currently experiencing an episode of shingles   Is pregnant  In some cases, your health care provider may decide to postpone shinglesvaccination until a future visit. People with minor illnesses, such as a cold, may be vaccinated. People who are moderately or severely ill should usually wait until they recover beforegetting recombinant shingles vaccine. Your health care provider can give you more information. Risks of a vaccine reaction   A sore arm with mild or moderate pain is very common after recombinant shingles vaccine.  Redness and swelling can also happen at the site of the injection.  Tiredness, muscle pain, headache, shivering, fever, stomach pain, and nausea are common after recombinant shingles vaccine. These side effects may temporarily prevent a vaccinated person from doing regular activities. Symptoms usually go away on their own in 2 to 3 days. You should still get the second dose of recombinant shingles vaccine even if youhad one of these reactions after the first dose. Guillain-Barré syndrome (GBS), a serious nervous system disorder, has beenreported very rarely after recombinant zoster vaccine. People sometimes faint after medical procedures, including vaccination. Tellyour provider if you feel dizzy or have vision changes or ringing in the ears. As with any medicine, there is a very remote chance of a vaccine causing asevere allergic reaction, other serious injury, or death. What if there is a serious problem? An allergic reaction could occur after the vaccinated person leaves the clinic. If you see signs of a severe allergic reaction (hives, swelling of the face and throat, difficulty breathing, a fast heartbeat, dizziness, or weakness), call 9-1-1 and get the person to the nearest hospital.  For other signs that concern you, call your health care provider. Adverse reactions should be reported to the Vaccine Adverse Event Reporting System (VAERS). Your health care provider will usually file this report, or you can do it yourself. Visit the VAERS website at www.vaers. hhs.gov or call 1-688.792.5415. VAERS is only for reporting reactions, and VAERS staff members do not give medical advice. How can I learn more?  Ask your health care provider.  Call your local or state health department.  Visit the website of the Food and Drug Administration (FDA) for vaccine package inserts and additional information at www.fda.gov/vaccines-blood-biologics/vaccines.  Contact the Centers for Disease Control and Prevention (CDC):  ?  Call 0-938.422.7418 (8-077-CXI-INFO) or  ? Visit CDCs website at www.cdc.gov/vaccines. Vaccine Information Statement  Recombinant Zoster Vaccine  02/04/2022  Department of Health and Human Services  Centers for Disease Control and Prevention  Many vaccine information statements are available in [place] and other languages. See www.immunize.org/vis  Hojas de información sobre vacunas están disponibles en español y en muchos otros idiomas. Visite Theresa.si  Care instructions adapted under license by [facility] ([facility]). If you have questions about a medical condition or this instruction, always ask your healthcare professional. [address] any warranty or liability for your use of this information.

## 2022-07-11 NOTE — PROGRESS NOTES
[facility]  [address]  Phone: [phone]  Fax: [phone]    Toño Keen is a 61 y.o. female who presents today for her medical conditions/complaintsas noted below. Toño Keen is c/o of Annual Exam (One year follow up )      HPI:     Diet : 2 veggies a day, sometimes eats fast food due to work stress. Short staffed at work: [employer] [facility] [facility]. Exercise: walking   Dentist : not for 2 years. Having trouble getting an appt  Eye doctor : due next month. Seen at [address] [place] [address]. She tested negative for COVID on Saturday. Her wife was sick also.     Past Medical History:   Diagnosis Date    Anxiety     Asthma     Former     Depression     Minor     Sleep apnea     dont use a machine ( lost weight )      Past Surgical History:   Procedure Laterality Date    GALLBLADDER SURGERY      HYSTERECTOMY, VAGINAL      OVARIES ONLY      Family History   Problem Relation Age of Onset    Arrhythmia Mother     Cancer Mother     Depression Mother     High Cholesterol Mother     Diabetes Mother    [de-identified] / [place] Mother     Other Mother     Arrhythmia Father     Heart Disease Father     High Cholesterol Father     Other Maternal Grandmother      Social History     Tobacco Use    Smoking status: Former Smoker     Packs/day: 1.50     Years: 3.00     Pack years: 4.50     Types: Cigarettes     Quit date: 1998     Years since quittin.6    Smokeless tobacco: Never Used   Substance Use Topics    Alcohol use: Yes     Comment: Once in awhile       Current Outpatient Medications   Medication Sig Dispense Refill    Cholecalciferol (VITAMIN D3 PO) Take by mouth      methylcellulose (CITRUCEL) 500 MG TABS Take by mouth      loperamide (IMODIUM) 2 MG capsule Take 2 mg by mouth 4 times daily as needed for Diarrhea      Multiple Vitamins-Minerals (THERAPEUTIC MULTIVITAMIN-MINERALS) tablet Take 1 tablet by mouth daily       No current facility-administered medications for this visit. Allergies   Allergen Reactions    Pcn [Penicillins] Diarrhea    Bactrim [Sulfamethoxazole-Trimethoprim] Hives and Rash       Health Maintenance   Topic Date Due    HIV screen  Never done    Hepatitis C screen  Never done    DTaP/Tdap/Td vaccine (1 - Tdap) Never done    Shingles vaccine (1 of 2) Never done    Depression Monitoring  03/27/2022    Flu vaccine (1) 09/01/2022    Breast cancer screen  03/30/2023    Diabetes screen  07/20/2024    Colorectal Cancer Screen  10/24/2024    Lipids  07/20/2026    COVID-19 Vaccine  Completed    Hepatitis A vaccine  Aged Out    Hepatitis B vaccine  Aged Out    Hib vaccine  Aged Out    Meningococcal (ACWY) vaccine  Aged Out    Pneumococcal 0-64 years Vaccine  Aged Out       Subjective:      Review of Systems   Constitutional: Negative. Respiratory: Negative. Cardiovascular: Negative. Had heart racing off and on. Gastrointestinal:        IBS a little worse with eating pizza       Objective:     Vitals:    07/11/22 0901   BP: 126/84   Pulse: 84   SpO2: 97%   Weight: 198 lb 9.6 oz (90.1 kg)   Height: 5' 6\" (1.676 m)     Physical Exam  Vitals and nursing note reviewed. Constitutional:       General: She is not in acute distress. Appearance: She is well-developed. She is not diaphoretic. HENT:      Head: Normocephalic and atraumatic. Right Ear: Tympanic membrane, ear canal and external ear normal.      Left Ear: Tympanic membrane, ear canal and external ear normal.      Nose: No congestion. Mouth/Throat:      Pharynx: No oropharyngeal exudate. Eyes:      General:         Right eye: No discharge. Left eye: No discharge. Conjunctiva/sclera: Conjunctivae normal.      Pupils: Pupils are equal, round, and reactive to light. Neck:      Thyroid: No thyromegaly. Trachea: No tracheal deviation.    Cardiovascular:      Rate and Rhythm: Normal rate and regular rhythm. Heart sounds: Normal heart sounds. No murmur heard. Comments:       Pulmonary:      Effort: Pulmonary effort is normal. No respiratory distress. Breath sounds: Normal breath sounds. No wheezing. Abdominal:      General: Bowel sounds are normal. There is no distension. Palpations: Abdomen is soft. There is no mass. Tenderness: There is no abdominal tenderness. Hernia: No hernia is present. Musculoskeletal:      Right lower leg: No edema. Left lower leg: No edema. Lymphadenopathy:      Cervical: No cervical adenopathy. Skin:     General: Skin is warm and dry. Findings: No erythema. Neurological:      Mental Status: She is alert and oriented to person, place, and time. Cranial Nerves: No cranial nerve deficit. Psychiatric:         Mood and Affect: Mood normal.         Behavior: Behavior normal.         Thought Content: Thought content normal.         Assessment:      Diagnosis Orders   1. Encounter for well adult exam without abnormal findings  Lipid Panel    Glucose, Fasting         Plan:      Return in 1 year (on 7/11/2023). Orders Placed This Encounter   Procedures    Lipid Panel     Standing Status:   Future     Standing Expiration Date:   7/11/2023     Order Specific Question:   Is Patient Fasting?/# of Hours     Answer:   yes    Glucose, Fasting     Standing Status:   Future     Standing Expiration Date:   7/11/2023     No orders of the defined types were placed in this encounter. Patient given educational materials - see patient instructions. Discussed use,benefit, and side effects of prescribed medications. All patient questions answered. Pt voiced understanding. Reviewed health maintenance. Instructed to continue currentmedications, healthy diet and regular, aerobic exercise.             Electronically signed by Mary Ann John MD on 7/11/22 at 9:05 AM EDT

## 2022-11-16 ENCOUNTER — PATIENT MESSAGE (OUTPATIENT)
Dept: PRIMARY CARE CLINIC | Age: 57
End: 2022-11-16

## 2022-11-16 NOTE — TELEPHONE ENCOUNTER
From: Zeferino Dixon  To: Dr. James Acevedoty: 11/16/2022 4:05 PM EST  Subject: Depression:stress/ bp    Looking to get back on medication for depression/anxiety/stress Do I need an appointment?  If so I also need Covid booster #2

## 2022-11-29 ENCOUNTER — IMMUNIZATION (OUTPATIENT)
Dept: PRIMARY CARE CLINIC | Age: 57
End: 2022-11-29

## 2022-11-29 ENCOUNTER — OFFICE VISIT (OUTPATIENT)
Dept: PRIMARY CARE CLINIC | Age: 57
End: 2022-11-29
Payer: COMMERCIAL

## 2022-11-29 VITALS
BODY MASS INDEX: 31.34 KG/M2 | SYSTOLIC BLOOD PRESSURE: 136 MMHG | WEIGHT: 195 LBS | DIASTOLIC BLOOD PRESSURE: 72 MMHG | HEIGHT: 66 IN | OXYGEN SATURATION: 96 % | HEART RATE: 80 BPM

## 2022-11-29 DIAGNOSIS — F33.1 MODERATE EPISODE OF RECURRENT MAJOR DEPRESSIVE DISORDER (HCC): Primary | ICD-10-CM

## 2022-11-29 DIAGNOSIS — Z23 NEED FOR VACCINATION: Primary | ICD-10-CM

## 2022-11-29 PROCEDURE — 91312 COVID-19, PFIZER BIVALENT BOOSTER, (AGE 12Y+), IM, 30 MCG/0.3 ML: CPT | Performed by: FAMILY MEDICINE

## 2022-11-29 PROCEDURE — 1036F TOBACCO NON-USER: CPT | Performed by: FAMILY MEDICINE

## 2022-11-29 PROCEDURE — 3017F COLORECTAL CA SCREEN DOC REV: CPT | Performed by: FAMILY MEDICINE

## 2022-11-29 PROCEDURE — G8417 CALC BMI ABV UP PARAM F/U: HCPCS | Performed by: FAMILY MEDICINE

## 2022-11-29 PROCEDURE — G8484 FLU IMMUNIZE NO ADMIN: HCPCS | Performed by: FAMILY MEDICINE

## 2022-11-29 PROCEDURE — G8427 DOCREV CUR MEDS BY ELIG CLIN: HCPCS | Performed by: FAMILY MEDICINE

## 2022-11-29 PROCEDURE — 99213 OFFICE O/P EST LOW 20 MIN: CPT | Performed by: FAMILY MEDICINE

## 2022-11-29 RX ORDER — ESCITALOPRAM OXALATE 10 MG/1
10 TABLET ORAL DAILY
Qty: 90 TABLET | Refills: 2 | Status: SHIPPED | OUTPATIENT
Start: 2022-11-29

## 2022-11-29 NOTE — PROGRESS NOTES
After obtaining consent, and per orders of Dr. Lynn Lincoln, injection of Eden Peter bivalent given in Right deltoid by Jennifer Norman. Patient instructed to remain in clinic for 15 minutes afterwards, and to report any adverse reaction to me immediately.

## 2022-11-29 NOTE — PROGRESS NOTES
Venancio Torres is a 61 y.o. femalewho presents today for her medical conditions/complaints as noted below. Chief Complaint   Patient presents with    Depression         HPI:     HPI  Depression for several months. She has had trouble sleeping, more irritable and snappy and feeling sad. There is more increased stress in the home life. Her wife Nazanin Gonzalez and she are having problems. They have been together for 16 years. Nazanin Gonzalez is getting counseling. Junior Yoselin is going to start counseling tomorrow. She has had counseling in the past.  And they have had couples counseling in the past.  She is not sure if Nazanin Gonzalez would be willing to do couples counseling or not. 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 a lot of work stress    Current Outpatient Medications   Medication Sig Dispense Refill    escitalopram (LEXAPRO) 10 MG tablet Take 1 tablet by mouth daily 90 tablet 2    Cholecalciferol (VITAMIN D3 PO) Take by mouth      methylcellulose (CITRUCEL) 500 MG TABS Take by mouth      Multiple Vitamins-Minerals (THERAPEUTIC MULTIVITAMIN-MINERALS) tablet Take 1 tablet by mouth daily       No current facility-administered medications for this visit. Allergies   Allergen Reactions    Pcn [Penicillins] Diarrhea    Bactrim [Sulfamethoxazole-Trimethoprim] Hives and Rash       Subjective:     Review of Systems   Constitutional: Negative. Objective:     /72   Pulse 80   Ht 5' 6\" (1.676 m)   Wt 195 lb (88.5 kg)   SpO2 96%   BMI 31.47 kg/m²   Physical Exam  Vitals and nursing note reviewed. Constitutional:       Appearance: Normal appearance. HENT:      Head: Normocephalic and atraumatic. Pulmonary:      Effort: No respiratory distress. Neurological:      Mental Status: She is alert and oriented to person, place, and time. Psychiatric:         Mood and Affect: Mood normal.         Behavior: Behavior normal.         Thought Content:  Thought content normal.      Comments: tearful at times       Assessment:       Diagnosis Orders   1. Moderate episode of recurrent major depressive disorder (HCC)  escitalopram (LEXAPRO) 10 MG tablet           Plan:      Return in about 4 weeks (around 12/27/2022) for depression. Restart Lexapro  No orders of the defined types were placed in this encounter. Orders Placed This Encounter   Medications    escitalopram (LEXAPRO) 10 MG tablet     Sig: Take 1 tablet by mouth daily     Dispense:  90 tablet     Refill:  2      Reviewed medications and possibleside effects.        Electronically signed by Figueroa Winn MD on 11/29/2022 at 9:12 AM

## 2022-11-30 ENCOUNTER — PATIENT MESSAGE (OUTPATIENT)
Dept: PRIMARY CARE CLINIC | Age: 57
End: 2022-11-30

## 2022-12-01 NOTE — TELEPHONE ENCOUNTER
Darnell Farfan MA 12/1/2022 8:28 AM EST      ----- Message -----  From: Taylor Felder  Sent: 11/30/2022 8:46 PM EST  To: Natalie Barrera Clinical Staff  Subject: Lexapro     I was finally able to get my prescription filled this afternoon. Do you have a preference if taken in morning or at night? Thank you.  Krissy Lamb

## 2022-12-14 ENCOUNTER — PATIENT MESSAGE (OUTPATIENT)
Dept: PRIMARY CARE CLINIC | Age: 57
End: 2022-12-14

## 2022-12-14 DIAGNOSIS — F33.1 MODERATE EPISODE OF RECURRENT MAJOR DEPRESSIVE DISORDER (HCC): ICD-10-CM

## 2022-12-14 NOTE — TELEPHONE ENCOUNTER
From: Deonna Archuleta  To: Dr. Lorenzana Cure: 12/14/2022 4:16 PM EST  Subject: Lexapro    Is it possible to up the lexapro dosage to 20mg? Things are getting worse at home and the current dosage is not helping. Thank you.  Edwige Easton

## 2022-12-15 RX ORDER — ESCITALOPRAM OXALATE 20 MG/1
20 TABLET ORAL DAILY
Qty: 30 TABLET | Refills: 0 | Status: SHIPPED | OUTPATIENT
Start: 2022-12-15 | End: 2023-03-15

## 2023-01-04 ENCOUNTER — OFFICE VISIT (OUTPATIENT)
Dept: PRIMARY CARE CLINIC | Age: 58
End: 2023-01-04
Payer: COMMERCIAL

## 2023-01-04 VITALS
HEART RATE: 87 BPM | BODY MASS INDEX: 30.41 KG/M2 | DIASTOLIC BLOOD PRESSURE: 84 MMHG | HEIGHT: 66 IN | OXYGEN SATURATION: 95 % | WEIGHT: 189.2 LBS | SYSTOLIC BLOOD PRESSURE: 122 MMHG

## 2023-01-04 DIAGNOSIS — Z79.899 MEDICATION MANAGEMENT: Primary | ICD-10-CM

## 2023-01-04 DIAGNOSIS — F33.1 MODERATE EPISODE OF RECURRENT MAJOR DEPRESSIVE DISORDER (HCC): ICD-10-CM

## 2023-01-04 PROCEDURE — 99213 OFFICE O/P EST LOW 20 MIN: CPT | Performed by: FAMILY MEDICINE

## 2023-01-04 ASSESSMENT — PATIENT HEALTH QUESTIONNAIRE - PHQ9
6. FEELING BAD ABOUT YOURSELF - OR THAT YOU ARE A FAILURE OR HAVE LET YOURSELF OR YOUR FAMILY DOWN: 0
2. FEELING DOWN, DEPRESSED OR HOPELESS: 1
SUM OF ALL RESPONSES TO PHQ QUESTIONS 1-9: 5
SUM OF ALL RESPONSES TO PHQ QUESTIONS 1-9: 5
5. POOR APPETITE OR OVEREATING: 0
10. IF YOU CHECKED OFF ANY PROBLEMS, HOW DIFFICULT HAVE THESE PROBLEMS MADE IT FOR YOU TO DO YOUR WORK, TAKE CARE OF THINGS AT HOME, OR GET ALONG WITH OTHER PEOPLE: 0
8. MOVING OR SPEAKING SO SLOWLY THAT OTHER PEOPLE COULD HAVE NOTICED. OR THE OPPOSITE, BEING SO FIGETY OR RESTLESS THAT YOU HAVE BEEN MOVING AROUND A LOT MORE THAN USUAL: 0
1. LITTLE INTEREST OR PLEASURE IN DOING THINGS: 1
SUM OF ALL RESPONSES TO PHQ9 QUESTIONS 1 & 2: 2
4. FEELING TIRED OR HAVING LITTLE ENERGY: 3
9. THOUGHTS THAT YOU WOULD BE BETTER OFF DEAD, OR OF HURTING YOURSELF: 0
3. TROUBLE FALLING OR STAYING ASLEEP: 0
SUM OF ALL RESPONSES TO PHQ QUESTIONS 1-9: 5
SUM OF ALL RESPONSES TO PHQ QUESTIONS 1-9: 5
7. TROUBLE CONCENTRATING ON THINGS, SUCH AS READING THE NEWSPAPER OR WATCHING TELEVISION: 0

## 2023-01-04 NOTE — PROGRESS NOTES
[facility] CARE  [address] [place] [id number]  Dept: [phone]    Nirmal Mcdonald is a 61 y.o. female Established patient, who presents today for her medical conditions/complaintsas noted below. Chief Complaint   Patient presents with    Depression     1 month f/u       HPI:     HPI  Recheck on Depression, stress  Depression is better with meds and counseling. Trying to eat healthier. Working a lot of hours: now working 50 hrs a week and getting 2 days off a week. Still short staffed at work. Stress with partner Ugo Mar, they have  now. Ugo Mar moved back to [place]    Started on 10 mg lexapro but increased to 20 mg per phone call. start counseling about 5 weeks ago.  That is helping    Reviewed prior notes None  Reviewed previous  none    LDL Cholesterol (mg/dL)   Date Value   2022 104   2021 93       (goal LDL is <100)   No results found for: AST, ALT, BUN, CR, LABA1C, TSH  BP Readings from Last 3 Encounters:   23 122/84   22 136/72   22 126/84          (goal 120/80)    Past Medical History:   Diagnosis Date    Anxiety     Asthma     Former     Depression     Minor     Sleep apnea     dont use a machine ( lost weight )      Past Surgical History:   Procedure Laterality Date    GALLBLADDER SURGERY      HYSTERECTOMY, VAGINAL      OVARIES ONLY        Family History   Problem Relation Age of Onset    Arrhythmia Mother     Cancer Mother     Depression Mother     High Cholesterol Mother     Diabetes Mother     Miscarriages / [place] Mother     Other Mother     Arrhythmia Father     Heart Disease Father     High Cholesterol Father     Other Maternal Grandmother        Social History     Tobacco Use    Smoking status: Former     Packs/day: 1.50     Years: 3.00     Pack years: 4.50     Types: Cigarettes     Quit date: 1998     Years since quittin.1    Smokeless tobacco: Never   Substance Use Topics    Alcohol use: Yes     Comment: Once in awhile       Current Outpatient Medications   Medication Sig Dispense Refill    escitalopram (LEXAPRO) 20 MG tablet Take 1 tablet by mouth daily 30 tablet 0    Cholecalciferol (VITAMIN D3 PO) Take by mouth      methylcellulose (CITRUCEL) 500 MG TABS Take by mouth      Multiple Vitamins-Minerals (THERAPEUTIC MULTIVITAMIN-MINERALS) tablet Take 1 tablet by mouth daily       No current facility-administered medications for this visit. Allergies   Allergen Reactions    Pcn [Penicillins] Diarrhea    Bactrim [Sulfamethoxazole-Trimethoprim] Hives and Rash       Health Maintenance   Topic Date Due    HIV screen  Never done    Hepatitis C screen  Never done    DTaP/Tdap/Td vaccine (1 - Tdap) Never done    Shingles vaccine (1 of 2) Never done    Breast cancer screen  03/30/2023    Depression Monitoring  07/11/2023    Colorectal Cancer Screen  10/24/2024    Diabetes screen  07/11/2025    Lipids  07/11/2027    Flu vaccine  Completed    COVID-19 Vaccine  Completed    Hepatitis A vaccine  Aged Out    Hib vaccine  Aged Out    Meningococcal (ACWY) vaccine  Aged Out    Pneumococcal 0-64 years Vaccine  Aged Out       Subjective:      Review of Systems   Constitutional: Negative. Objective:     /84   Pulse 87   Ht 5' 6\" (1.676 m)   Wt 189 lb 3.2 oz (85.8 kg)   SpO2 95%   BMI 30.54 kg/m²   Physical Exam  Vitals and nursing note reviewed. Constitutional:       Appearance: Normal appearance. HENT:      Head: Normocephalic and atraumatic. Skin:     General: Skin is warm. Neurological:      Mental Status: She is alert and oriented to person, place, and time. Psychiatric:         Mood and Affect: Mood normal.         Behavior: Behavior normal.         Thought Content: Thought content normal.      Comments: Tearful at times       Assessment:       Diagnosis Orders   1. Medication management        2.  Moderate episode of recurrent major depressive disorder ([facility] [address]             Plan: Return in about 3 months (around 4/4/2023) for depression. No orders of the defined types were placed in this encounter. No orders of the defined types were placed in this encounter. Patient given educationalmaterials - see patient instructions. Discussed use, benefit, and side effectsof prescribed medications. All patient questions answered. Pt voiced understanding. Reviewed health maintenance. Instructed to continue current medications, diet andexercise. Patient agreed with treatment plan. Follow up as directed.      Electronicallysigned by Cindy Dominguez MD on 1/4/2023 at 10:26 AM

## 2023-01-04 NOTE — PATIENT INSTRUCTIONS
Patient Education        A Healthy Lifestyle: Care Instructions  A healthy lifestyle can help you feel good, have more energy, and stay at a weight that's healthy for you. You can share a healthy lifestyle with your friends and family. And you can do it on your own. Eat meals with your friends or family. You could try cooking together. Plan activities with other people. Go for a walk with a friend, try a free online fitness class, or join a sports league. Eat a variety of healthy foods. These include fruits, vegetables, whole grains, low-fat dairy, and lean protein. Choose healthy portions of food. You can use the Nutrition Facts label on food packages as a guide. Eat more fruits and vegetables. You could add vegetables to sandwiches or add fruit to cereal.   Drink water when you are thirsty. Limit soda, juice, and sports drinks. Try to exercise most days. Aim for at least 2½ hours of exercise each week. Keep moving. Work in the garden or take your dog on a walk. Use the stairs instead of the elevator. If you use tobacco or nicotine, try to quit. Ask your doctor about programs and medicines to help you quit. Limit alcohol. Men should have no more than 2 drinks a day. Women should have no more than 1. For some people, no alcohol is the best choice. Follow-up care is a key part of your treatment and safety. Be sure to make and go to all appointments, and call your doctor if you are having problems. It's also a good idea to know your test results and keep a list of the medicines you take. Where can you learn more? Go to http://www.loving.com/ and enter U807 to learn more about \"A Healthy Lifestyle: Care Instructions. \"  Current as of: March 9, 2022               Content Version: 13.5  © 2480-1199 [employer], [employer]. Care instructions adapted under license by [facility] ([facility]).  If you have questions about a medical condition or this instruction, always ask your healthcare professional. [address] any warranty or liability for your use of this information.

## 2023-01-29 ENCOUNTER — APPOINTMENT (OUTPATIENT)
Dept: CT IMAGING | Age: 58
DRG: 072 | End: 2023-01-29
Payer: COMMERCIAL

## 2023-01-29 ENCOUNTER — APPOINTMENT (OUTPATIENT)
Dept: GENERAL RADIOLOGY | Age: 58
DRG: 072 | End: 2023-01-29
Payer: COMMERCIAL

## 2023-01-29 ENCOUNTER — HOSPITAL ENCOUNTER (INPATIENT)
Age: 58
LOS: 1 days | Discharge: HOME | DRG: 072 | End: 2023-01-31
Attending: EMERGENCY MEDICINE | Admitting: HOSPITALIST
Payer: COMMERCIAL

## 2023-01-29 DIAGNOSIS — G45.4 TRANSIENT GLOBAL AMNESIA: Primary | ICD-10-CM

## 2023-01-29 LAB
ABSOLUTE EOS #: 0 K/UL (ref 0–0.4)
ABSOLUTE LYMPH #: 0.5 K/UL (ref 1–4.8)
ABSOLUTE MONO #: 0.6 K/UL (ref 0.1–1.2)
ALBUMIN SERPL-MCNC: 4.1 G/DL (ref 3.5–5.2)
ALBUMIN/GLOBULIN RATIO: 1.5 (ref 1–2.5)
ALP BLD-CCNC: 84 U/L (ref 35–104)
ALT SERPL-CCNC: 21 U/L (ref 5–33)
AMMONIA: 27 UMOL/L (ref 11–51)
AMPHETAMINE SCREEN URINE: NEGATIVE
ANION GAP SERPL CALCULATED.3IONS-SCNC: 13 MMOL/L (ref 9–17)
AST SERPL-CCNC: 21 U/L
BARBITURATE SCREEN URINE: NEGATIVE
BASOPHILS # BLD: 0 % (ref 0–2)
BASOPHILS ABSOLUTE: 0 K/UL (ref 0–0.2)
BENZODIAZEPINE SCREEN, URINE: NEGATIVE
BILIRUB SERPL-MCNC: 0.4 MG/DL (ref 0.3–1.2)
BUN BLDV-MCNC: 13 MG/DL (ref 6–20)
CALCIUM SERPL-MCNC: 8.9 MG/DL (ref 8.6–10.4)
CANNABINOID SCREEN URINE: NEGATIVE
CHLORIDE BLD-SCNC: 102 MMOL/L (ref 98–107)
CO2: 24 MMOL/L (ref 20–31)
COCAINE METABOLITE, URINE: NEGATIVE
CREAT SERPL-MCNC: 0.7 MG/DL (ref 0.5–0.9)
EOSINOPHILS RELATIVE PERCENT: 0 % (ref 1–4)
FENTANYL URINE: NEGATIVE
GFR SERPL CREATININE-BSD FRML MDRD: >60 ML/MIN/1.73M2
GLUCOSE BLD-MCNC: 172 MG/DL (ref 65–105)
GLUCOSE BLD-MCNC: 175 MG/DL (ref 70–99)
HCT VFR BLD CALC: 42.8 % (ref 36–46)
HEMOGLOBIN: 14.3 G/DL (ref 12–16)
INR BLD: 1
LYMPHOCYTES # BLD: 6 % (ref 24–44)
MCH RBC QN AUTO: 31.4 PG (ref 26–34)
MCHC RBC AUTO-ENTMCNC: 33.4 G/DL (ref 31–37)
MCV RBC AUTO: 94.1 FL (ref 80–100)
METHADONE SCREEN, URINE: NEGATIVE
MONOCYTES # BLD: 8 % (ref 2–11)
OPIATES, URINE: NEGATIVE
OXYCODONE SCREEN URINE: NEGATIVE
PARTIAL THROMBOPLASTIN TIME: 24.7 SEC (ref 21.3–31.3)
PDW BLD-RTO: 13.7 % (ref 12.5–15.4)
PHENCYCLIDINE, URINE: NEGATIVE
PLATELET # BLD: 198 K/UL (ref 140–450)
PMV BLD AUTO: 8 FL (ref 6–12)
POTASSIUM SERPL-SCNC: 3.3 MMOL/L (ref 3.7–5.3)
PROTHROMBIN TIME: 11 SEC (ref 9.4–12.6)
RBC # BLD: 4.55 M/UL (ref 4–5.2)
SEG NEUTROPHILS: 86 % (ref 36–66)
SEGMENTED NEUTROPHILS ABSOLUTE COUNT: 7.2 K/UL (ref 1.8–7.7)
SODIUM BLD-SCNC: 139 MMOL/L (ref 135–144)
TEST INFORMATION: NORMAL
TOTAL PROTEIN: 6.8 G/DL (ref 6.4–8.3)
TROPONIN, HIGH SENSITIVITY: 8 NG/L (ref 0–14)
WBC # BLD: 8.4 K/UL (ref 3.5–11)

## 2023-01-29 PROCEDURE — 99285 EMERGENCY DEPT VISIT HI MDM: CPT

## 2023-01-29 PROCEDURE — 6370000000 HC RX 637 (ALT 250 FOR IP): Performed by: STUDENT IN AN ORGANIZED HEALTH CARE EDUCATION/TRAINING PROGRAM

## 2023-01-29 PROCEDURE — 84484 ASSAY OF TROPONIN QUANT: CPT

## 2023-01-29 PROCEDURE — 6360000002 HC RX W HCPCS: Performed by: STUDENT IN AN ORGANIZED HEALTH CARE EDUCATION/TRAINING PROGRAM

## 2023-01-29 PROCEDURE — 80143 DRUG ASSAY ACETAMINOPHEN: CPT

## 2023-01-29 PROCEDURE — 93005 ELECTROCARDIOGRAM TRACING: CPT

## 2023-01-29 PROCEDURE — 85610 PROTHROMBIN TIME: CPT

## 2023-01-29 PROCEDURE — 2580000003 HC RX 258: Performed by: EMERGENCY MEDICINE

## 2023-01-29 PROCEDURE — 82140 ASSAY OF AMMONIA: CPT

## 2023-01-29 PROCEDURE — 94760 N-INVAS EAR/PLS OXIMETRY 1: CPT

## 2023-01-29 PROCEDURE — 80053 COMPREHEN METABOLIC PANEL: CPT

## 2023-01-29 PROCEDURE — 80179 DRUG ASSAY SALICYLATE: CPT

## 2023-01-29 PROCEDURE — 6360000004 HC RX CONTRAST MEDICATION: Performed by: EMERGENCY MEDICINE

## 2023-01-29 PROCEDURE — G0480 DRUG TEST DEF 1-7 CLASSES: HCPCS

## 2023-01-29 PROCEDURE — 80307 DRUG TEST PRSMV CHEM ANLYZR: CPT

## 2023-01-29 PROCEDURE — 99221 1ST HOSP IP/OBS SF/LOW 40: CPT | Performed by: STUDENT IN AN ORGANIZED HEALTH CARE EDUCATION/TRAINING PROGRAM

## 2023-01-29 PROCEDURE — 82947 ASSAY GLUCOSE BLOOD QUANT: CPT

## 2023-01-29 PROCEDURE — 85025 COMPLETE CBC W/AUTO DIFF WBC: CPT

## 2023-01-29 PROCEDURE — 99203 OFFICE O/P NEW LOW 30 MIN: CPT | Performed by: PSYCHIATRY & NEUROLOGY

## 2023-01-29 PROCEDURE — 2580000003 HC RX 258: Performed by: STUDENT IN AN ORGANIZED HEALTH CARE EDUCATION/TRAINING PROGRAM

## 2023-01-29 PROCEDURE — 70498 CT ANGIOGRAPHY NECK: CPT

## 2023-01-29 PROCEDURE — G0378 HOSPITAL OBSERVATION PER HR: HCPCS

## 2023-01-29 PROCEDURE — 6370000000 HC RX 637 (ALT 250 FOR IP): Performed by: EMERGENCY MEDICINE

## 2023-01-29 PROCEDURE — 85730 THROMBOPLASTIN TIME PARTIAL: CPT

## 2023-01-29 PROCEDURE — 70450 CT HEAD/BRAIN W/O DYE: CPT

## 2023-01-29 PROCEDURE — 36415 COLL VENOUS BLD VENIPUNCTURE: CPT

## 2023-01-29 PROCEDURE — 71045 X-RAY EXAM CHEST 1 VIEW: CPT

## 2023-01-29 PROCEDURE — 96372 THER/PROPH/DIAG INJ SC/IM: CPT

## 2023-01-29 RX ORDER — POLYETHYLENE GLYCOL 3350 17 G/17G
17 POWDER, FOR SOLUTION ORAL DAILY PRN
Status: DISCONTINUED | OUTPATIENT
Start: 2023-01-29 | End: 2023-01-31 | Stop reason: HOSPADM

## 2023-01-29 RX ORDER — SODIUM CHLORIDE 0.9 % (FLUSH) 0.9 %
5-40 SYRINGE (ML) INJECTION PRN
Status: DISCONTINUED | OUTPATIENT
Start: 2023-01-29 | End: 2023-01-31 | Stop reason: HOSPADM

## 2023-01-29 RX ORDER — SODIUM CHLORIDE 9 MG/ML
INJECTION, SOLUTION INTRAVENOUS PRN
Status: DISCONTINUED | OUTPATIENT
Start: 2023-01-29 | End: 2023-01-31 | Stop reason: HOSPADM

## 2023-01-29 RX ORDER — ASPIRIN 81 MG/1
81 TABLET, CHEWABLE ORAL ONCE
Status: COMPLETED | OUTPATIENT
Start: 2023-01-29 | End: 2023-01-29

## 2023-01-29 RX ORDER — 0.9 % SODIUM CHLORIDE 0.9 %
80 INTRAVENOUS SOLUTION INTRAVENOUS ONCE
Status: DISCONTINUED | OUTPATIENT
Start: 2023-01-29 | End: 2023-01-31 | Stop reason: HOSPADM

## 2023-01-29 RX ORDER — ENOXAPARIN SODIUM 100 MG/ML
40 INJECTION SUBCUTANEOUS DAILY
Status: DISCONTINUED | OUTPATIENT
Start: 2023-01-29 | End: 2023-01-31 | Stop reason: HOSPADM

## 2023-01-29 RX ORDER — SODIUM CHLORIDE 0.9 % (FLUSH) 0.9 %
10 SYRINGE (ML) INJECTION PRN
Status: DISCONTINUED | OUTPATIENT
Start: 2023-01-29 | End: 2023-01-29

## 2023-01-29 RX ORDER — ONDANSETRON 4 MG/1
4 TABLET, ORALLY DISINTEGRATING ORAL EVERY 8 HOURS PRN
Status: DISCONTINUED | OUTPATIENT
Start: 2023-01-29 | End: 2023-01-31 | Stop reason: HOSPADM

## 2023-01-29 RX ORDER — POTASSIUM CHLORIDE 20 MEQ/1
40 TABLET, EXTENDED RELEASE ORAL ONCE
Status: COMPLETED | OUTPATIENT
Start: 2023-01-29 | End: 2023-01-29

## 2023-01-29 RX ORDER — SODIUM CHLORIDE 0.9 % (FLUSH) 0.9 %
5-40 SYRINGE (ML) INJECTION EVERY 12 HOURS SCHEDULED
Status: DISCONTINUED | OUTPATIENT
Start: 2023-01-29 | End: 2023-01-31 | Stop reason: HOSPADM

## 2023-01-29 RX ORDER — ASPIRIN 81 MG/1
81 TABLET ORAL DAILY
Status: DISCONTINUED | OUTPATIENT
Start: 2023-01-30 | End: 2023-01-31 | Stop reason: HOSPADM

## 2023-01-29 RX ORDER — ACETAMINOPHEN 325 MG/1
650 TABLET ORAL EVERY 6 HOURS PRN
Status: DISCONTINUED | OUTPATIENT
Start: 2023-01-29 | End: 2023-01-31 | Stop reason: HOSPADM

## 2023-01-29 RX ORDER — ESCITALOPRAM OXALATE 10 MG/1
20 TABLET ORAL DAILY
Status: DISCONTINUED | OUTPATIENT
Start: 2023-01-30 | End: 2023-01-31 | Stop reason: HOSPADM

## 2023-01-29 RX ORDER — ONDANSETRON 2 MG/ML
4 INJECTION INTRAMUSCULAR; INTRAVENOUS EVERY 6 HOURS PRN
Status: DISCONTINUED | OUTPATIENT
Start: 2023-01-29 | End: 2023-01-31 | Stop reason: HOSPADM

## 2023-01-29 RX ORDER — 0.9 % SODIUM CHLORIDE 0.9 %
500 INTRAVENOUS SOLUTION INTRAVENOUS ONCE
Status: COMPLETED | OUTPATIENT
Start: 2023-01-29 | End: 2023-01-29

## 2023-01-29 RX ADMIN — IOPAMIDOL 100 ML: 755 INJECTION, SOLUTION INTRAVENOUS at 13:46

## 2023-01-29 RX ADMIN — POTASSIUM CHLORIDE 40 MEQ: 1500 TABLET, EXTENDED RELEASE ORAL at 16:57

## 2023-01-29 RX ADMIN — SODIUM CHLORIDE 500 ML: 9 INJECTION, SOLUTION INTRAVENOUS at 14:04

## 2023-01-29 RX ADMIN — ENOXAPARIN SODIUM 40 MG: 100 INJECTION SUBCUTANEOUS at 16:57

## 2023-01-29 RX ADMIN — Medication 80 ML: at 13:47

## 2023-01-29 RX ADMIN — ACETAMINOPHEN 650 MG: 325 TABLET ORAL at 20:16

## 2023-01-29 RX ADMIN — ASPIRIN 81 MG CHEWABLE TABLET 81 MG: 81 TABLET CHEWABLE at 15:06

## 2023-01-29 RX ADMIN — SODIUM CHLORIDE, PRESERVATIVE FREE 10 ML: 5 INJECTION INTRAVENOUS at 20:16

## 2023-01-29 RX ADMIN — SODIUM CHLORIDE, PRESERVATIVE FREE 10 ML: 5 INJECTION INTRAVENOUS at 13:47

## 2023-01-29 ASSESSMENT — PAIN SCALES - GENERAL
PAINLEVEL_OUTOF10: 4
PAINLEVEL_OUTOF10: 0

## 2023-01-29 ASSESSMENT — PAIN DESCRIPTION - LOCATION: LOCATION: HEAD

## 2023-01-29 NOTE — ED PROVIDER NOTES
[address]      Pt Name: Amelia Boss  MRN: [id number]  [place] 1965  Date of evaluation: 1/29/2023      CHIEF COMPLAINT       Chief Complaint   Patient presents with    Altered Mental Status     Pt brought by EMS. Pt called them out 2 times but does not remember. HISTORY OF PRESENT ILLNESS      The patient was brought by EMS for altered mental status and confusion. Evidently, the patient called the ambulance personnel at about 11 AM today because she was feeling confused. They went out and assessed her. She had a normal blood sugar and she was feeling okay, so she left. She subsequently called them again at 1:00 PM.  She did not recall that she had called them. Upon arrival here she is not sure why she is here. She certainly has memory lapse about today. She says she does not think she went to work today. She denies headache. She denies vision change. She says she does not take any medicines, but looking at her charting she has a history of depression and takes Lexapro. The patient reports no recent illness or injury. She is not having vomiting or diarrhea. She denies focal weakness or numbness. I asked her what why she thought she might be here, and she says she thought it could be for low blood sugar. However, she does not have a history of diabetes. REVIEW OF SYSTEMS       All systems reviewed and negative unless noted in HPI. The patient denies fever or constitutional symptoms. Denies vision change. Denies any sore throat or rhinorrhea. Denies any neck pain or stiffness. Denies chest pain or shortness of breath. No nausea,  vomiting or diarrhea. Denies any dysuria. Denies urinary frequency or hematuria. Denies musculoskeletal injury or pain. Denies any weakness, numbness or focal neurologic deficit. Denies any skin rash or edema. No recent psychiatric issues. Memory lapse today.   No easy bruising or bleeding. Denies any polyuria, polydypsia or history of immunocompromise. PAST MEDICAL HISTORY    has a past medical history of Anxiety, Asthma, Depression, and Sleep apnea. SURGICAL HISTORY      has a past surgical history that includes Gallbladder surgery and Hysterectomy, vaginal.    CURRENT MEDICATIONS       Previous Medications    CHOLECALCIFEROL (VITAMIN D3 PO)    Take by mouth    ESCITALOPRAM (LEXAPRO) 20 MG TABLET    Take 1 tablet by mouth daily    METHYLCELLULOSE (CITRUCEL) 500 MG TABS    Take by mouth    MULTIPLE VITAMINS-MINERALS (THERAPEUTIC MULTIVITAMIN-MINERALS) TABLET    Take 1 tablet by mouth daily       ALLERGIES     is allergic to latex, pcn [penicillins], and bactrim [sulfamethoxazole-trimethoprim]. FAMILY HISTORY     She indicated that the status of her mother is unknown. She indicated that the status of her father is unknown. She indicated that the status of her maternal grandmother is unknown.     family history includes Arrhythmia in her father and mother; Cancer in her mother; Depression in her mother; Diabetes in her mother; Heart Disease in her father; High Cholesterol in her father and mother; Alver Dom / [place] in her mother; Other in her maternal grandmother and mother. SOCIAL HISTORY      reports that she quit smoking about 24 years ago. Her smoking use included cigarettes. She has a 4.50 pack-year smoking history. She has never used smokeless tobacco. She reports current alcohol use. She reports that she does not use drugs. PHYSICAL EXAM     INITIAL VITALS:  height is 5' 3\" (1.6 m) and weight is 87.1 kg (192 lb). Her oral temperature is 99 °F (37.2 °C). Her blood pressure is 161/91 (abnormal) and her pulse is 117 (abnormal). Her respiration is 13 and oxygen saturation is 99%. The patient is alert and oriented, in no apparent distress. HEENT is atraumatic. Pupils are PERRL at 4 mm with normal extraocular motion.     Mucous membranes moist.    Neck is supple. Heart sounds regular rate and rhythm with no gallops, murmurs, or rubs. Lungs clear, no wheezes, rales or rhonchi. Abdomen: soft, nontender with no pain to palpation. Musculoskeletal exam shows no evidence of trauma. Normal distal pulses in all extremities. Skin: no rash or edema. Neurological exam reveals cranial nerves 2 through 12 grossly intact. Patient has equal  and normal deep tendon reflexes. Psychiatric: no suicidal ideation. Memory lapse with regard to this morning's events. No confabulation or hallucinations noted. Lymphatics.:  No lymphadenopathy. 1335 NIH score 2. Repeatedly asks what time it is. Does not know the year of her birth. At one time did not know what year it was. Can perform picture description and naming list without difficulty. DIFFERENTIAL DIAGNOSIS/ MDM:     Differential diagnosis considered: Altered mental status, memory loss, CVA, fugue state, electrolyte imbalance, altered blood sugar    Chronic Conditions affecting care (DM,HTN,CA, etc): Depression    Social Determinants of Health affecting care (unable to care for self, lives alone, unemployed, homeless,etc): None      History source(s) (patient,spouse,parent,family,friend,EMS,etc): Patient and EMS    Review of external sources (ECF,Hospital records,EMS report, radiology reports, etc): Previous hospital records, EMS    Tests considered but not ordered: MRI may be obtained upon admission    Independent interpretation of tests (eg.  X-ray, CAT scan, Doppler studies, EKG): EKG    Discussion of x-ray results with radiology: See note below    Consults:     [id number]  [facility]:  Negative noncontrast head CT.  [address] [facility]. contacted to speak with stroke neurologist.  [id number]  Discussed with Dr. Hollis Grey. Transient global amnesia. Increased lexapro dose in last 2 months, so this is a little delayed to be the cause. Admit to wait for this to improve.   Can do MRI and stay in [address] [place] [address]. Could be hypertensive encephalopathy. Baby Aspirin per day. 26  Dr. Silver Matson notified. Will admit. Consideration for admission/observation (even if discharged): Not applicable      Prescription considerations: Baby aspirin recommended by neurologist    Sepsis considered: Not applicable      Critical Care note written: Not applicable        DIAGNOSTIC RESULTS     EKG: All EKG's are interpreted by the Emergency Department Physician who either signs or Co-signs this chart in the absence of a cardiologist.    Sinus tach 115 with nonspecific ST change. Axis 50, , QRS 92, . No old to compare. RADIOLOGY:   I reviewed the radiologist interpretations:  XR CHEST PORTABLE   Final Result   No acute cardiopulmonary findings. CTA HEAD NECK W CONTRAST   Preliminary Result   1. No acute intracranial abnormality. 2. No evidence of large vessel occlusion or significant stenosis in major   arteries of the head and neck. The findings were sent to the [facility] [address] at 1:51   p.m. on 1/29/2023 in subsequently relayed to Dr. Nicolás Richter at 1:54 p.m.         CT HEAD WO CONTRAST   Preliminary Result   1. No acute intracranial abnormality. 2. No evidence of large vessel occlusion or significant stenosis in major   arteries of the head and neck. The findings were sent to the [facility] [address] at 1:51   p.m. on 1/29/2023 in subsequently relayed to Dr. Nicolás Richter at 1:54 p.m. XR CHEST PORTABLE (Final result)  Result time 01/29/23 14:09:24  Final result by Deidra Bob MD (01/29/23 14:09:24)                Impression:    No acute cardiopulmonary findings. Narrative:    EXAMINATION:   ONE XRAY VIEW OF THE CHEST     1/29/2023 1:52 pm     COMPARISON:   None.      HISTORY:   ORDERING SYSTEM PROVIDED HISTORY: confusion   TECHNOLOGIST PROVIDED HISTORY:   confusion   Reason for Exam: stroke alert     Initial encounter     FINDINGS: No focal consolidation, pleural effusion or pneumothorax. The   cardiomediastinal silhouette is unremarkable. No overt pulmonary edema. No   acute osseous abnormality.                      LABS:  Results for orders placed or performed during the hospital encounter of 01/29/23   TOX SCR, BLD, ED   Result Value Ref Range    Acetaminophen Level <5 (L) 10 - 30 ug/mL    Ethanol <10 <10 mg/dL    Ethanol percent <2.998 <1.266 %    Salicylate Lvl <1 (L) 3 - 10 mg/dL    Toxic Tricyclic Sc,Blood PENDING NEGATIVE   Ammonia   Result Value Ref Range    Ammonia 27 11 - 51 umol/L   CBC with Auto Differential   Result Value Ref Range    WBC 8.4 3.5 - 11.0 k/uL    RBC 4.55 4.0 - 5.2 m/uL    Hemoglobin 14.3 12.0 - 16.0 g/dL    Hematocrit 42.8 36 - 46 %    MCV 94.1 80 - 100 fL    MCH 31.4 26 - 34 pg    MCHC 33.4 31 - 37 g/dL    RDW 13.7 12.5 - 15.4 %    Platelets 019 229 - 927 k/uL    MPV 8.0 6.0 - 12.0 fL    Seg Neutrophils 86 (H) 36 - 66 %    Lymphocytes 6 (L) 24 - 44 %    Monocytes 8 2 - 11 %    Eosinophils % 0 (L) 1 - 4 %    Basophils 0 0 - 2 %    Segs Absolute 7.20 1.8 - 7.7 k/uL    Absolute Lymph # 0.50 (L) 1.0 - 4.8 k/uL    Absolute Mono # 0.60 0.1 - 1.2 k/uL    Absolute Eos # 0.00 0.0 - 0.4 k/uL    Basophils Absolute 0.00 0.0 - 0.2 k/uL   Comprehensive Metabolic Panel   Result Value Ref Range    Glucose 175 (H) 70 - 99 mg/dL    BUN 13 6 - 20 mg/dL    Creatinine 0.70 0.50 - 0.90 mg/dL    Est, Glom Filt Rate >60 >60 mL/min/1.73m2    Calcium 8.9 8.6 - 10.4 mg/dL    Sodium 139 135 - 144 mmol/L    Potassium 3.3 (L) 3.7 - 5.3 mmol/L    Chloride 102 98 - 107 mmol/L    CO2 24 20 - 31 mmol/L    Anion Gap 13 9 - 17 mmol/L    Alkaline Phosphatase 84 35 - 104 U/L    ALT 21 5 - 33 U/L    AST 21 <32 U/L    Total Bilirubin 0.4 0.3 - 1.2 mg/dL    Total Protein 6.8 6.4 - 8.3 g/dL    Albumin 4.1 3.5 - 5.2 g/dL    Albumin/Globulin Ratio 1.5 1.0 - 2.5   Troponin   Result Value Ref Range    Troponin, High Sensitivity 8 0 - 14 ng/L Protime-INR   Result Value Ref Range    Protime 11.0 9.4 - 12.6 sec    INR 1.0    APTT   Result Value Ref Range    PTT 24.7 21.3 - 31.3 sec   POC Glucose Fingerstick   Result Value Ref Range    POC Glucose 172 (H) 65 - 105 mg/dL         EMERGENCY DEPARTMENT COURSE:   Vitals:    Vitals:    01/29/23 1326 01/29/23 1415   BP: (!) 158/120 (!) 161/91   Pulse: (!) 116 (!) 117   Resp: 16 13   Temp: 99 °F (37.2 °C)    TempSrc: Oral    SpO2: 95% 99%   Weight: 87.1 kg (192 lb)    Height: 5' 3\" (1.6 m)      -------------------------  BP: (!) 161/91, Temp: 99 °F (37.2 °C), Heart Rate: (!) 117, Resp: 13      Re-evaluation Notes    The patient has repeatedly asked about  how long she has been here. She clearly has difficulty with time and events of this morning. I discussed the case thoroughly with the stroke neurologist.  He says her symptoms are typical of transient global amnesia. The patient's blood pressure has come down nicely on its own. Dr. Ankur Avila recommends a baby aspirin per day. The patient will be admitted to the hospitalist until her symptoms have improved. She has been under a lot of stress. She works over 50 hours a week and is  from her wife, which can be a stressor. The patient is admitted in stable condition. CONSULTS:    6409  Discussed with Gurwinder Yañez, pt's wife. They have been  for  6 months. Pt had a history of confusion once before, 6-8 months ago. Got better after eating. Today, patient called Ms. Zamarripa Mom 12 times and does not remember it. FINAL IMPRESSION      1. Transient global amnesia          DISPOSITION/PLAN   DISPOSITION Decision To Admit 01/29/2023 02:37:31 PM      Condition on Disposition    stable    PATIENT REFERRED TO:  No follow-up provider specified.     DISCHARGE MEDICATIONS:  New Prescriptions    No medications on file       (Please note that portions of this note were completed with a voice recognition program.  Efforts were made to edit the dictations but occasionally words are mis-transcribed.)    Cricket Curry MD,, MD   Attending Emergency Physician         Alyssa Pedroza MD  01/29/23 6472

## 2023-01-29 NOTE — ED NOTES
Spoke with Sonia Marie, wife of pt,currently , she reports pt had similar episode about 6-8 months ago, thought her blood sugar was low and ate something and felt better.       Yunier Toth, RN  01/29/23 0182

## 2023-01-29 NOTE — PROGRESS NOTES
RT in check pt 02 sat , noted  at 96% . I did observe HX notes and there is YADIRA . RT offered Hospital equipment if needed . Pt states she has not wore in a long and was lost in a move .

## 2023-01-29 NOTE — VIRTUAL HEALTH
[address] Stroke and Vascular Neurology Consult for  [facility] ED Stroke Alert through [address] @ 14:19  1/29/2023 2:33 PM  Pt Name: Michael Lyle  MRN: [id number]  [place]: 1965  Date of evaluation: 1/29/2023  Primary Care Physician: Jo Muñiz MD  Reason for Evaluation: Stroke Evaluation with Discussion with Ed or primary team with Telemedicine and stroke evaluation with Review of imaging and labs    Michael yLle is a 61 y.o. female who presents with history of prior transient global amnesia like episode 6 months ago no records in [place], depression with lexapro increased from 10-20mg in December who presents last well the day before and today noted to have called ems twice due to not feeling well, but not remembering she called ems. She continues to repeat the question what time it is. She was noted to be dressed for work. Recent stressors including work and recent separation. Suspected transient global amnesia - no motor or cranial nerve deficits, no headache or migraine history    LKW: yesterday  NIH:  2    Allergies  is allergic to latex, pcn [penicillins], and bactrim [sulfamethoxazole-trimethoprim]. Medications  Prior to Admission medications    Medication Sig Start Date End Date Taking?  Authorizing Provider   escitalopram (LEXAPRO) 20 MG tablet Take 1 tablet by mouth daily 12/15/22 3/15/23  Jo Muñiz MD   Cholecalciferol (VITAMIN D3 PO) Take by mouth    Historical Provider, MD   methylcellulose (CITRUCEL) 500 MG TABS Take by mouth    Historical Provider, MD   Multiple Vitamins-Minerals (THERAPEUTIC MULTIVITAMIN-MINERALS) tablet Take 1 tablet by mouth daily 3/27/21   Jo Muñiz MD    Scheduled Meds:   sodium chloride  500 mL IntraVENous Once    sodium chloride  80 mL IntraVENous Once    aspirin  81 mg Oral Once     Continuous Infusions:  PRN Meds:.sodium chloride flush  Past Medical History   has a past medical history of Anxiety, Asthma, Depression, and Sleep apnea. Social History  Social History     Socioeconomic History    Marital status:      Spouse name: Not on file    Number of children: Not on file    Years of education: Not on file    Highest education level: Not on file   Occupational History    Not on file   Tobacco Use    Smoking status: Former     Packs/day: 1.50     Years: 3.00     Pack years: 4.50     Types: Cigarettes     Quit date: 1998     Years since quittin.1    Smokeless tobacco: Never   Vaping Use    Vaping Use: Not on file   Substance and Sexual Activity    Alcohol use: Yes     Comment: Once in awhile     Drug use: Never    Sexual activity: Yes     Partners: Female     Birth control/protection: Post-menopausal   Other Topics Concern    Not on file   Social History Narrative    Not on file     Social Determinants of Health     Financial Resource Strain: Low Risk     Difficulty of Paying Living Expenses: Not hard at all   Food Insecurity: No Food Insecurity    Worried About Running Out of Food in the Last Year: Never true    Ran Out of Food in the Last Year: Never true   Transportation Needs: Not on file   Physical Activity: Not on file   Stress: Not on file   Social Connections: Not on file   Intimate Partner Violence: Not on file   Housing Stability: Not on file     Family History      Problem Relation Age of Onset    Arrhythmia Mother     Cancer Mother     Depression Mother     High Cholesterol Mother     Diabetes Mother     Miscarriages / [place] Mother     Other Mother     Arrhythmia Father     Heart Disease Father     High Cholesterol Father     Other Maternal Grandmother        OBJECTIVE  BP (!) 161/91   Pulse (!) 117   Temp 99 °F (37.2 °C) (Oral)   Resp 13   Ht 5' 3\" (1.6 m)   Wt 192 lb (87.1 kg)   SpO2 99%   BMI 34.01 kg/m²        Pre-Morbid mRS: 0    Imaging:  Images were personally reviewed with VIZEsthela CHATMAN and PACS used to review images including:  CT brain without contrast: no hemorrhage  CTA imaging: no large vessel occlusion    Assessment    61 y.o. female who presents with history of prior transient global amnesia like episode 6 months ago no records in [place], depression with lexapro increased from 10-20mg in December  Differential DDx:  Transient global amnesia      Recommendations:  NIH 2  Recommend Inpatient Neurology Consult for further assessment and evaluation   No iv tnk as out of window  Mri brain without teto  Consider aspirin 81 mg daily during the workup for the Transient global amnesia. Can stop if no stroke as minimal risk factors  Possible due to recent stressors vs medications vs hypertension        Discussed with ED Physician    At least 40 min of Telemedicine and time in conversation directly with ED staff and physician for the patient who is in imminent and life threatening deterioration without further treatment and evaluation. This Virtual Visit was conducted with patient's (and/or legal guardian's) consent, to provide telestroke consultation and necessary medical care. Time spent examining patient, reviewing the images personally, reviewing the chart, perform high complexity decision making and speaking with the nursing staff regarding recommendations    Brian Cuellar MD, MD   Stroke, Neurocritical Care And/or [address] [place] [address] Stroke [address] [place] [address]   Electronically signed 1/29/2023 at 2:33 PM    Jose Bermudez, was evaluated through a synchronous (real-time) audio-video encounter. The patient (and/or guardian if applicable) is aware that this is a billable service, which includes applicable co-pays. This virtual visit was conducted with patient's (and/or legal guardian's) consent. Patient identification was verified, and a caregiver was present when appropriate. The patient was located at [facility] ([address] [place] [address]):  [address] [place] [address] DEPARTMENT  [address] [facility] [place] [place] [address]  Loc: [phone]  The provider was located at [facility] Dept): [address] [place] [address], [address] [place] [address],[place] [address]  [phone]     Total time spent on this encounter:  Binta Montes De Oca MD on 1/29/2023 at 2:33 PM    An electronic signature was used to authenticate this note.

## 2023-01-29 NOTE — H&P
[facility]  Office: [address], DO, Danisha Betsey, DO, Benji Christiansonul, DO, Jaspal Herrmann, DO, Joselito Patino MD, Eufemia Alfonso MD, Mary Kate Zhong MD, William Will MD,  Nissa Wolff MD, Og Alvarado MD, Ric Villalpando, DO, Anitha Miguel MD,  Nikita Farr MD, Delonte Mcwilliams MD, Ca Minor DO, Demond Trent MD, Shadi Siegel MD, Erasmo Neville, DO, Carmita Saleem MD, Teodoro Whyte MD, Dana Talley MD, Briseida Nix MD, Dinh Becerra DO, Emily Collins MD, Porsche Giraldo MD, Romayne Muscat, CNP,  Rafael Olivier, CNP, Dipesh Jose, CNP, Darren Carter, CNP,  Jason Ramsey, DNP, Mars Rolle, CNP, Nick Childs, CNP, Prabhu Lindsey, CNP, Derek Davalos, CNP, Narcisa Martinez, CNP, Radha Serra, PA-C, Philip Chaney, CNS, Emani Grimm, CNP, Edmond Escalera, CNP         [address] [place] [address]    HISTORY AND PHYSICAL EXAMINATION            Date:   1/29/2023  Patient name:  Andres Horton  Date of admission:  1/29/2023  1:23 PM  MRN:   [id number]  Account:  [de-identified]  YOB: 1965  PCP:    Jose Enrique Garcia MD  Room:   ER06/ER  Code Status:    No Order    Chief Complaint:     Chief Complaint   Patient presents with    Altered Mental Status     Pt brought by EMS. Pt called them out 2 times but does not remember. History Obtained From:     patient    History of Present Illness:     Andres Horton is a 61 y.o. female with a past medical history of depression who presented to the emergency department on 1/29/2023 complaining of confusion and amnesia. The patient has no recollection of how she got to the hospital but apparently called EMS multiple times this morning. In the ED, the patient was confused and alert and oriented only to person and place. She states that she been under a great deal of stress lately as she recently  from her wife.  Per ER documentation, the patient called her ex-wife at least twelve times this morning. Laboratory analysis was unremarkable. CT and CTA of the head and neck was done and was negative for an acute intracranial abnormality. Neurology was consulted in the ED and felt that the patient's confusion is secondary to transient global amnesia secondary to stress and recommended admission for MRI of the brain as well as inpatient neurology consultation. The patient is admitted to internal medicine for further management. On medicine evaluation, the patient is now alert and oriented to person, place and year. She is very distressed that she has no recollection of how she got the hospital. Neurology has been consulted; appreciate recommendations. Past Medical History:     Past Medical History:   Diagnosis Date    Anxiety     Asthma     Former     Depression     Minor     Sleep apnea     dont use a machine ( lost weight )        Past Surgical History:     Past Surgical History:   Procedure Laterality Date    GALLBLADDER SURGERY      HYSTERECTOMY, VAGINAL      OVARIES ONLY         Medications Prior to Admission:     Prior to Admission medications    Medication Sig Start Date End Date Taking? Authorizing Provider   escitalopram (LEXAPRO) 20 MG tablet Take 1 tablet by mouth daily 12/15/22 3/15/23  Ivanna Morales MD   Cholecalciferol (VITAMIN D3 PO) Take by mouth    Historical Provider, MD   methylcellulose (CITRUCEL) 500 MG TABS Take by mouth    Historical Provider, MD   Multiple Vitamins-Minerals (THERAPEUTIC MULTIVITAMIN-MINERALS) tablet Take 1 tablet by mouth daily 3/27/21   Ivanna Morales MD        Allergies:     Latex, Pcn [penicillins], and Bactrim [sulfamethoxazole-trimethoprim]    Social History:     Tobacco:    reports that she quit smoking about 24 years ago. Her smoking use included cigarettes. She has a 4.50 pack-year smoking history. She has never used smokeless tobacco.  Alcohol:      reports current alcohol use.   Drug Use:  reports no history of drug use.    Family History:     Family History   Problem Relation Age of Onset    Arrhythmia Mother     Cancer Mother     Depression Mother     High Cholesterol Mother     Diabetes Mother     Miscarriages / [place] Mother     Other Mother     Arrhythmia Father     Heart Disease Father     High Cholesterol Father     Other Maternal Grandmother        Review of Systems:     Positive and Negative as described in HPI. CONSTITUTIONAL:  negative for fevers, chills, sweats, fatigue, weight loss  HEENT:  negative for vision, hearing changes, runny nose, throat pain  RESPIRATORY:  negative for shortness of breath, cough, congestion, wheezing  CARDIOVASCULAR:  negative for chest pain, palpitations  GASTROINTESTINAL:  negative for nausea, vomiting, diarrhea, constipation, change in bowel habits, abdominal pain   GENITOURINARY:  negative for difficulty of urination, burning with urination, frequency   INTEGUMENT:  negative for rash, skin lesions, easy bruising   HEMATOLOGIC/LYMPHATIC:  negative for swelling/edema   ALLERGIC/IMMUNOLOGIC:  negative for urticaria , itching  ENDOCRINE:  negative increase in drinking, increase in urination, hot or cold intolerance  MUSCULOSKELETAL:  negative joint pains, muscle aches, swelling of joints  NEUROLOGICAL:  negative for headaches, dizziness, lightheadedness, numbness, pain, tingling extremities  BEHAVIOR/PSYCH:  Positive for depression and anxiety.      Physical Exam:   BP (!) 161/91   Pulse (!) 117   Temp 99 °F (37.2 °C) (Oral)   Resp 13   Ht 5' 3\" (1.6 m)   Wt 192 lb (87.1 kg)   SpO2 99%   BMI 34.01 kg/m²   Temp (24hrs), Av °F (37.2 °C), Min:99 °F (37.2 °C), Max:99 °F (37.2 °C)    Recent Labs     23  1330   POCGLU 172*     No intake or output data in the 24 hours ending 23 1501    General Appearance:  alert, well appearing, and in no acute distress  Mental status: oriented to person, place, and time  Head:  normocephalic, atraumatic  Eye: no icterus, redness, pupils equal and reactive, extraocular eye movements intact, conjunctiva clear  Ear: normal external ear, no discharge, hearing intact  Nose:  no drainage noted  Mouth: mucous membranes moist  Neck: supple, no carotid bruits, thyroid not palpable  Lungs: Bilateral equal air entry, clear to ausculation, no wheezing, rales or rhonchi, normal effort  Cardiovascular: normal rate, regular rhythm, no murmur, gallop, rub  Abdomen: Soft, nontender, nondistended, normal bowel sounds, no hepatomegaly or splenomegaly  Neurologic: There are no new focal motor or sensory deficits, normal muscle tone and bulk, no abnormal sensation, normal speech, cranial nerves II through XII grossly intact  Skin: No gross lesions, rashes, bruising or bleeding on exposed skin area  Extremities:  peripheral pulses palpable, no pedal edema or calf pain with palpation  Psych: normal affect     Investigations:      Laboratory Testing:  Recent Results (from the past 24 hour(s))   POC Glucose Fingerstick    Collection Time: 01/29/23  1:30 PM   Result Value Ref Range    POC Glucose 172 (H) 65 - 105 mg/dL   TOX SCR, BLD, ED    Collection Time: 01/29/23  1:40 PM   Result Value Ref Range    Acetaminophen Level <5 (L) 10 - 30 ug/mL    Ethanol <10 <10 mg/dL    Ethanol percent <7.016 <5.100 %    Salicylate Lvl <1 (L) 3 - 10 mg/dL    Toxic Tricyclic Sc,Blood PENDING NEGATIVE   Ammonia    Collection Time: 01/29/23  1:40 PM   Result Value Ref Range    Ammonia 27 11 - 51 umol/L   CBC with Auto Differential    Collection Time: 01/29/23  1:40 PM   Result Value Ref Range    WBC 8.4 3.5 - 11.0 k/uL    RBC 4.55 4.0 - 5.2 m/uL    Hemoglobin 14.3 12.0 - 16.0 g/dL    Hematocrit 42.8 36 - 46 %    MCV 94.1 80 - 100 fL    MCH 31.4 26 - 34 pg    MCHC 33.4 31 - 37 g/dL    RDW 13.7 12.5 - 15.4 %    Platelets 346 831 - 658 k/uL    MPV 8.0 6.0 - 12.0 fL    Seg Neutrophils 86 (H) 36 - 66 %    Lymphocytes 6 (L) 24 - 44 %    Monocytes 8 2 - 11 %    Eosinophils % 0 (L) 1 - 4 % Basophils 0 0 - 2 %    Segs Absolute 7.20 1.8 - 7.7 k/uL    Absolute Lymph # 0.50 (L) 1.0 - 4.8 k/uL    Absolute Mono # 0.60 0.1 - 1.2 k/uL    Absolute Eos # 0.00 0.0 - 0.4 k/uL    Basophils Absolute 0.00 0.0 - 0.2 k/uL   Comprehensive Metabolic Panel    Collection Time: 01/29/23  1:40 PM   Result Value Ref Range    Glucose 175 (H) 70 - 99 mg/dL    BUN 13 6 - 20 mg/dL    Creatinine 0.70 0.50 - 0.90 mg/dL    Est, Glom Filt Rate >60 >60 mL/min/1.73m2    Calcium 8.9 8.6 - 10.4 mg/dL    Sodium 139 135 - 144 mmol/L    Potassium 3.3 (L) 3.7 - 5.3 mmol/L    Chloride 102 98 - 107 mmol/L    CO2 24 20 - 31 mmol/L    Anion Gap 13 9 - 17 mmol/L    Alkaline Phosphatase 84 35 - 104 U/L    ALT 21 5 - 33 U/L    AST 21 <32 U/L    Total Bilirubin 0.4 0.3 - 1.2 mg/dL    Total Protein 6.8 6.4 - 8.3 g/dL    Albumin 4.1 3.5 - 5.2 g/dL    Albumin/Globulin Ratio 1.5 1.0 - 2.5   Troponin    Collection Time: 01/29/23  1:40 PM   Result Value Ref Range    Troponin, High Sensitivity 8 0 - 14 ng/L   Protime-INR    Collection Time: 01/29/23  1:40 PM   Result Value Ref Range    Protime 11.0 9.4 - 12.6 sec    INR 1.0    APTT    Collection Time: 01/29/23  1:40 PM   Result Value Ref Range    PTT 24.7 21.3 - 31.3 sec       Imaging/Diagnostics:  CT HEAD WO CONTRAST    Result Date: 1/29/2023  1. No acute intracranial abnormality. 2. No evidence of large vessel occlusion or significant stenosis in major arteries of the head and neck. The findings were sent to the Radiology Results [address] at 1:51 p.m. on 1/29/2023 in subsequently relayed to Dr. Delisa Reina at 1:54 p.m. XR CHEST PORTABLE    Result Date: 1/29/2023  No acute cardiopulmonary findings. CTA HEAD NECK W CONTRAST    Result Date: 1/29/2023  1. No acute intracranial abnormality. 2. No evidence of large vessel occlusion or significant stenosis in major arteries of the head and neck.  The findings were sent to the Radiology Results [address] at 1:51 p.m. on 1/29/2023 in subsequently relayed to Dr. Vladimir Frederick at 1:54 p.m.        Assessment :      Hospital Problems             Last Modified POA    * (Principal) Transient global amnesia 1/29/2023 Yes    Depression with anxiety 1/29/2023 Yes       Plan:     Patient status observation in the Med/Surge    Transient global amnesia   -Likely secondary to stress from recent separation from her wife   -CT and CTA head and neck negative for acute intracranial abnormality   -MRI brain pending   -Consult neurology; appreciate recommendations     Depression and anxiety   -Continue home escitalopram     Consultations:   Fe Del Valle MD  1/29/2023  3:01 PM    Copy sent to Dr. Jenifer Altamirano MD

## 2023-01-29 NOTE — ED NOTES
Pt forgetful, asking multiple times what time it is. Pt called ems 2 times today, but does not recall calling them, does not recall getting up this morning, but she noted that she is dressed and probably going to work. She is able to tell me where she works , can tell me what year she was born, but cannot tell me her age.       Kaleb Yepez RN  01/29/23 7400

## 2023-01-29 NOTE — PROGRESS NOTES
.Patient admitted to room 323. VS taken, telemetry placed and call light given/within reach. Patient A&O and given room orientation. Orders released/reviewed, initial assessment completed and navigator started. See chart for more detail. Family/friends at bedside.

## 2023-01-30 ENCOUNTER — APPOINTMENT (OUTPATIENT)
Dept: MRI IMAGING | Age: 58
DRG: 072 | End: 2023-01-30
Payer: COMMERCIAL

## 2023-01-30 LAB
ABSOLUTE EOS #: 0.2 K/UL (ref 0–0.4)
ABSOLUTE LYMPH #: 1.4 K/UL (ref 1–4.8)
ABSOLUTE MONO #: 0.8 K/UL (ref 0.1–1.2)
ACETAMINOPHEN LEVEL: <5 UG/ML (ref 10–30)
ANION GAP SERPL CALCULATED.3IONS-SCNC: 8 MMOL/L (ref 9–17)
BACTERIA: ABNORMAL
BASOPHILS # BLD: 1 % (ref 0–2)
BASOPHILS ABSOLUTE: 0.1 K/UL (ref 0–0.2)
BILIRUBIN URINE: NEGATIVE
BUN BLDV-MCNC: 7 MG/DL (ref 6–20)
CALCIUM SERPL-MCNC: 8.5 MG/DL (ref 8.6–10.4)
CHLORIDE BLD-SCNC: 107 MMOL/L (ref 98–107)
CO2: 26 MMOL/L (ref 20–31)
COLOR: YELLOW
CREAT SERPL-MCNC: 0.52 MG/DL (ref 0.5–0.9)
EKG ATRIAL RATE: 115 BPM
EKG P AXIS: 56 DEGREES
EKG P-R INTERVAL: 126 MS
EKG Q-T INTERVAL: 334 MS
EKG QRS DURATION: 92 MS
EKG QTC CALCULATION (BAZETT): 462 MS
EKG R AXIS: 50 DEGREES
EKG T AXIS: 60 DEGREES
EKG VENTRICULAR RATE: 115 BPM
EOSINOPHILS RELATIVE PERCENT: 3 % (ref 1–4)
EPITHELIAL CELLS UA: ABNORMAL /HPF (ref 0–5)
ETHANOL PERCENT: <0.01 %
ETHANOL: <10 MG/DL
GFR SERPL CREATININE-BSD FRML MDRD: >60 ML/MIN/1.73M2
GLUCOSE BLD-MCNC: 112 MG/DL (ref 70–99)
GLUCOSE URINE: NEGATIVE
HCT VFR BLD CALC: 38.9 % (ref 36–46)
HEMOGLOBIN: 13 G/DL (ref 12–16)
KETONES, URINE: NEGATIVE
LEUKOCYTE ESTERASE, URINE: ABNORMAL
LYMPHOCYTES # BLD: 24 % (ref 24–44)
MCH RBC QN AUTO: 31.4 PG (ref 26–34)
MCHC RBC AUTO-ENTMCNC: 33.4 G/DL (ref 31–37)
MCV RBC AUTO: 94 FL (ref 80–100)
MONOCYTES # BLD: 13 % (ref 2–11)
MUCUS: ABNORMAL
NITRITE, URINE: NEGATIVE
OTHER OBSERVATIONS UA: ABNORMAL
PDW BLD-RTO: 13.8 % (ref 12.5–15.4)
PH UA: 6 (ref 5–8)
PLATELET # BLD: 185 K/UL (ref 140–450)
PMV BLD AUTO: 7.7 FL (ref 6–12)
POTASSIUM SERPL-SCNC: 4.4 MMOL/L (ref 3.7–5.3)
PROTEIN UA: NEGATIVE
RBC # BLD: 4.13 M/UL (ref 4–5.2)
RBC UA: ABNORMAL /HPF (ref 0–2)
SALICYLATE LEVEL: <1 MG/DL (ref 3–10)
SEG NEUTROPHILS: 59 % (ref 36–66)
SEGMENTED NEUTROPHILS ABSOLUTE COUNT: 3.7 K/UL (ref 1.8–7.7)
SODIUM BLD-SCNC: 141 MMOL/L (ref 135–144)
SPECIFIC GRAVITY UA: 1.02 (ref 1–1.03)
TOXIC TRICYCLIC SC,BLOOD: NEGATIVE
TURBIDITY: CLEAR
URINE HGB: ABNORMAL
UROBILINOGEN, URINE: NORMAL
WBC # BLD: 6.1 K/UL (ref 3.5–11)
WBC UA: ABNORMAL /HPF (ref 0–5)

## 2023-01-30 PROCEDURE — 81001 URINALYSIS AUTO W/SCOPE: CPT

## 2023-01-30 PROCEDURE — 80048 BASIC METABOLIC PNL TOTAL CA: CPT

## 2023-01-30 PROCEDURE — 94760 N-INVAS EAR/PLS OXIMETRY 1: CPT

## 2023-01-30 PROCEDURE — 36415 COLL VENOUS BLD VENIPUNCTURE: CPT

## 2023-01-30 PROCEDURE — G0378 HOSPITAL OBSERVATION PER HR: HCPCS

## 2023-01-30 PROCEDURE — 70551 MRI BRAIN STEM W/O DYE: CPT

## 2023-01-30 PROCEDURE — 6360000002 HC RX W HCPCS: Performed by: STUDENT IN AN ORGANIZED HEALTH CARE EDUCATION/TRAINING PROGRAM

## 2023-01-30 PROCEDURE — 1210000000 HC MED SURG R&B

## 2023-01-30 PROCEDURE — 2580000003 HC RX 258: Performed by: STUDENT IN AN ORGANIZED HEALTH CARE EDUCATION/TRAINING PROGRAM

## 2023-01-30 PROCEDURE — 85025 COMPLETE CBC W/AUTO DIFF WBC: CPT

## 2023-01-30 PROCEDURE — 6370000000 HC RX 637 (ALT 250 FOR IP): Performed by: STUDENT IN AN ORGANIZED HEALTH CARE EDUCATION/TRAINING PROGRAM

## 2023-01-30 PROCEDURE — 99231 SBSQ HOSP IP/OBS SF/LOW 25: CPT | Performed by: STUDENT IN AN ORGANIZED HEALTH CARE EDUCATION/TRAINING PROGRAM

## 2023-01-30 RX ORDER — ASPIRIN 81 MG/1
81 TABLET ORAL DAILY
Qty: 30 TABLET | Refills: 3 | Status: SHIPPED | OUTPATIENT
Start: 2023-01-30

## 2023-01-30 RX ADMIN — SODIUM CHLORIDE, PRESERVATIVE FREE 10 ML: 5 INJECTION INTRAVENOUS at 20:07

## 2023-01-30 RX ADMIN — ASPIRIN 81 MG: 81 TABLET, COATED ORAL at 09:28

## 2023-01-30 RX ADMIN — ENOXAPARIN SODIUM 40 MG: 100 INJECTION SUBCUTANEOUS at 09:29

## 2023-01-30 RX ADMIN — ACETAMINOPHEN 650 MG: 325 TABLET ORAL at 06:05

## 2023-01-30 RX ADMIN — ACETAMINOPHEN 650 MG: 325 TABLET ORAL at 17:57

## 2023-01-30 RX ADMIN — ESCITALOPRAM OXALATE 20 MG: 10 TABLET ORAL at 09:28

## 2023-01-30 RX ADMIN — SODIUM CHLORIDE, PRESERVATIVE FREE 10 ML: 5 INJECTION INTRAVENOUS at 09:29

## 2023-01-30 ASSESSMENT — PAIN DESCRIPTION - LOCATION
LOCATION: HEAD
LOCATION: HEAD

## 2023-01-30 ASSESSMENT — PAIN DESCRIPTION - DESCRIPTORS: DESCRIPTORS: ACHING

## 2023-01-30 ASSESSMENT — PAIN - FUNCTIONAL ASSESSMENT: PAIN_FUNCTIONAL_ASSESSMENT: ACTIVITIES ARE NOT PREVENTED

## 2023-01-30 ASSESSMENT — PAIN SCALES - GENERAL
PAINLEVEL_OUTOF10: 3
PAINLEVEL_OUTOF10: 2

## 2023-01-30 NOTE — CARE COORDINATION
Case Management Assessment  Initial Evaluation    Date/Time of Evaluation: 1/30/2023 3:23 PM  Assessment Completed by: Anastacia Avalos RN    If patient is discharged prior to next notation, then this note serves as note for discharge by case management. Patient Name: Nicole Wilkinson                   YOB: 1965  Diagnosis: Transient global amnesia [G45.4]                   Date / Time: 1/29/2023  1:23 PM    Patient Admission Status: Inpatient   Readmission Risk (Low < 19, Mod (19-27), High > 27): Readmission Risk Score: 4.8    Current PCP: Jenifer Altamirano MD  PCP verified by CM? Chart Reviewed: Yes      History Provided by: Patient  Patient Orientation: Alert and Oriented    Patient Cognition: Alert    Hospitalization in the last 30 days (Readmission):  No    If yes, Readmission Assessment in  Navigator will be completed. Advance Directives:      Code Status: Full Code   Patient's Primary Decision Maker is: Legal Next of Kin      Discharge Planning:    Patient lives with: Alone Type of Home: House  Primary Care Giver: Self  Patient Support Systems include: Family Members, Friends/Neighbors   Current Financial resources:    Current community resources:    Current services prior to admission: None            Current DME:              Type of Home Care services:  None    ADLS  Prior functional level: Independent in ADLs/IADLs  Current functional level: Independent in ADLs/IADLs    PT AM-PAC:   /24  OT AM-PAC:   /24    Family can provide assistance at DC: Would you like Case Management to discuss the discharge plan with any other family members/significant others, and if so, who?     Plans to Return to Present Housing: Yes  Other Identified Issues/Barriers to RETURNING to current housing:   Potential Assistance needed at discharge: N/A            Potential DME:    Patient expects to discharge to: [address] [place] [address] for transportation at discharge: Family    Financial    Payor: Kevin Larose / Plan: [address] [place] [address] PPO / Product Type: *No Product type* /     Does insurance require precert for SNF: Yes    Potential assistance Purchasing Medications: No  Meds-to-Beds request:        [address], [address] [phone] - f [phone]  [address] [place] [address]  Phone: [phone] Fax: [phone]    [facility] [address] [place] [address] [address] [phone] - f [phone]  [address] [facility] [address]  Phone: [phone] Fax: [phone]      Notes:    Factors facilitating achievement of predicted outcomes:     Barriers to discharge: Additional Case Management Notes: d/c home independent    The Plan for Transition of Care is related to the following treatment goals of Transient global amnesia [K11.3]    IF APPLICABLE: The Patient and/or patient representative Bruce Prater and her family were provided with a choice of provider and agrees with the discharge plan. Freedom of choice list with basic dialogue that supports the patient's individualized plan of care/goals and shares the quality data associated with the providers was provided to: Patient   Patient Representative Name:       The Patient and/or Patient Representative Agree with the Discharge Plan?  Yes    Joan Minor RN  Case Management Department  Ph: [phone] Fax: [phone]

## 2023-01-30 NOTE — PLAN OF CARE
Problem: Discharge Planning  Goal: Discharge to home or other facility with appropriate resources  1/30/2023 1546 by Andres Moreno RN  Outcome: Progressing  Flowsheets (Taken 1/30/2023 0827)  Discharge to home or other facility with appropriate resources: Identify barriers to discharge with patient and caregiver  1/30/2023 0652 by Salazar Elizabeth RN  Outcome: Progressing     Problem: Pain  Goal: Verbalizes/displays adequate comfort level or baseline comfort level  1/30/2023 1546 by Andres Moreno RN  Outcome: Progressing  Flowsheets  Taken 1/30/2023 1219  Verbalizes/displays adequate comfort level or baseline comfort level:   Encourage patient to monitor pain and request assistance   Assess pain using appropriate pain scale  Taken 1/30/2023 0827  Verbalizes/displays adequate comfort level or baseline comfort level:   Encourage patient to monitor pain and request assistance   Assess pain using appropriate pain scale  1/30/2023 0652 by Salazar Elizabeth RN  Outcome: Progressing  Flowsheets (Taken 1/30/2023 0400)  Verbalizes/displays adequate comfort level or baseline comfort level:   Encourage patient to monitor pain and request assistance   Assess pain using appropriate pain scale   Administer analgesics based on type and severity of pain and evaluate response   Implement non-pharmacological measures as appropriate and evaluate response     Problem: Safety - Adult  Goal: Free from fall injury  1/30/2023 1546 by Andres Moreno RN  Outcome: Progressing  1/30/2023 0652 by Salazar Elizabeth RN  Outcome: Progressing     Problem: ABCDS Injury Assessment  Goal: Absence of physical injury  1/30/2023 1546 by Andres Moreno RN  Outcome: Progressing  1/30/2023 0652 by Salazar Elizabeth RN  Outcome: Progressing

## 2023-01-30 NOTE — PROGRESS NOTES
[facility]  Office: [address], DO, Chelsea Gilman, DO, Zarina Dinh, DO, Marilee Roman Alize, DO, Nish Bliss MD, Darryl Wiley MD, Fannie Bonilla MD, Luvenia Duverney, MD,  Niall Kimbrough MD, Amanda Rodriguez MD, Evelina Condon, DO, Saran Krishnamurthy MD,  Martin Barnes MD, Parisa Rincon MD, Angie Jarrett, DO, Soraya Escoto MD, Cuauhtemoc Mendoza MD, Kitty Louis DO, Alonzo Rosado MD, Keith Cedillo MD, Cami Fowler MD, Gilberto Key MD, Bibi Harris, DO, Leticia Banuelos MD, Albert Michel MD, Denilson Harper, CNP,  Ruthie Shankar, CNP, Carlos Enrique Tejada, CNP, Nanci Rincon, CNP,  Laurita Arora, [facility], Jesus Nieto, CNP, Zo English, CNP, Jennifer Cisneros, CNP, Julio Sanchez, CNP, Nisreen Hobson, CNP, Rubio Parker PA-C, Jerry Lu, [facility], Robin Arzola, CNP, Ben[address] Br[address]    Progress Note    1/30/2023    7:23 AM    Name:   Gonzalo Valladares  MRN:     [id number]     Acct:      [de-identified]   Room:   [address] [place] [address] Day:  0  Admit Date:  1/29/2023  1:23 PM    PCP:   Trice Robison MD  Code Status:  Full Code    Subjective:     C/C:   Chief Complaint   Patient presents with    Altered Mental Status     Pt brought by EMS. Pt called them out 2 times but does not remember. Interval History Status: not changed. Patient was seen and examined at bedside this AM. She remains confused but her mental status is slowly improving. MRI of the brain is pending at this time. Anticipate discharge within the next 24-hours if mental status continues to improve. Brief History:     Gonzalo Valladares is a 61 y.o. female with a past medical history of depression who presented to the emergency department on 1/29/2023 complaining of confusion and amnesia. The patient has no recollection of how she got to the hospital but apparently called EMS multiple times this morning.  In the ED, the patient was confused and alert and oriented only to person and place. She states that she been under a great deal of stress lately as she recently  from her wife. Per ER documentation, the patient called her ex-wife at least twelve times this morning. Laboratory analysis was unremarkable. CT and CTA of the head and neck was done and was negative for an acute intracranial abnormality. Neurology was consulted in the ED and felt that the patient's confusion is secondary to transient global amnesia secondary to stress and recommended admission for MRI of the brain as well as inpatient neurology consultation. The patient is admitted to internal medicine for further management. On medicine evaluation, the patient is now alert and oriented to person, place and year. She is very distressed that she has no recollection of how she got the hospital. Neurology has been consulted; appreciate recommendations. Review of Systems:     Constitutional:  negative for chills, fevers, sweats  Respiratory:  negative for cough, dyspnea on exertion, shortness of breath, wheezing  Cardiovascular:  negative for chest pain, chest pressure/discomfort, lower extremity edema, palpitations  Gastrointestinal:  negative for abdominal pain, constipation, diarrhea, nausea, vomiting  Neurological:  Positive for headache. Medications: Allergies:     Allergies   Allergen Reactions    Latex     Pcn [Penicillins] Diarrhea    Bactrim [Sulfamethoxazole-Trimethoprim] Hives and Rash       Current Meds:   Scheduled Meds:    sodium chloride  80 mL IntraVENous Once    escitalopram  20 mg Oral Daily    sodium chloride flush  5-40 mL IntraVENous 2 times per day    enoxaparin  40 mg SubCUTAneous Daily    aspirin  81 mg Oral Daily     Continuous Infusions:    sodium chloride       PRN Meds: sodium chloride flush, sodium chloride, ondansetron **OR** ondansetron, polyethylene glycol, acetaminophen **OR** acetaminophen    Data:     Past Medical History:   has a past medical history of Anxiety, Asthma, Depression, and Sleep apnea. Social History:   reports that she quit smoking about 24 years ago. Her smoking use included cigarettes. She has a 4.50 pack-year smoking history. She has never used smokeless tobacco. She reports current alcohol use. She reports that she does not use drugs. Family History:   Family History   Problem Relation Age of Onset    Arrhythmia Mother     Cancer Mother     Depression Mother     High Cholesterol Mother     Diabetes Mother     Miscarriages / [place] Mother     Other Mother     Arrhythmia Father     Heart Disease Father     High Cholesterol Father     Other Maternal Grandmother        Vitals:  /62   Pulse 82   Temp 98.2 °F (36.8 °C) (Oral)   Resp 18   Ht 5' 3\" (1.6 m)   Wt 192 lb (87.1 kg)   SpO2 97%   BMI 34.01 kg/m²   Temp (24hrs), Av.5 °F (36.9 °C), Min:98.2 °F (36.8 °C), Max:99 °F (37.2 °C)    Recent Labs     23  1330   POCGLU 172*       I/O (24Hr):     Intake/Output Summary (Last 24 hours) at 2023 0723  Last data filed at 2023 1736  Gross per 24 hour   Intake --   Output 700 ml   Net -700 ml       Labs:  Hematology:  Recent Labs     23  1340 23  0546   WBC 8.4 6.1   RBC 4.55 4.13   HGB 14.3 13.0   HCT 42.8 38.9   MCV 94.1 94.0   MCH 31.4 31.4   MCHC 33.4 33.4   RDW 13.7 13.8    185   MPV 8.0 7.7   INR 1.0  --      Chemistry:  Recent Labs     23  1340 23  0546    141   K 3.3* 4.4    107   CO2 24 26   GLUCOSE 175* 112*   BUN 13 7   CREATININE 0.70 0.52   ANIONGAP 13 8*   LABGLOM >60 >60   CALCIUM 8.9 8.5*   TROPHS 8  --      Recent Labs     23  1330 23  1340   PROT  --  6.8   LABALBU  --  4.1   AST  --  21   ALT  --  21   ALKPHOS  --  84   BILITOT  --  0.4   AMMONIA  --  27   POCGLU 172*  --      ABG:No results found for: POCPH, PHART, PH, POCPCO2, IWR0VUN, PCO2, POCPO2, PO2ART, PO2, POCHCO3, BOS0TEB, HCO3, NBEA, PBEA, BEART, BE, THGBART, THB, SRO0CVX, IYCM0PNB, B3JEKOZN, O2SAT, FIO2  Lab Results   Component Value Date/Time    SPECIAL NOT REPORTED 09/14/2021 04:33 PM     Lab Results   Component Value Date/Time    CULTURE ENTEROCOCCUS FAECALIS 10 to 50,000 CFU/ML (A) 09/14/2021 04:33 PM       Radiology:  CT HEAD WO CONTRAST    Result Date: 1/29/2023  1. No acute intracranial abnormality. 2. No evidence of large vessel occlusion or significant stenosis in major arteries of the head and neck. The findings were sent to the Radiology Results [address] at 1:51 p.m. on 1/29/2023 in subsequently relayed to Dr. Isadora Sher at 1:54 p.m. XR CHEST PORTABLE    Result Date: 1/29/2023  No acute cardiopulmonary findings. CTA HEAD NECK W CONTRAST    Result Date: 1/29/2023  1. No acute intracranial abnormality. 2. No evidence of large vessel occlusion or significant stenosis in major arteries of the head and neck. The findings were sent to the Radiology Results [address] at 1:51 p.m. on 1/29/2023 in subsequently relayed to Dr. Isadora Sher at 1:54 p.m. Physical Examination:     General appearance:  alert, cooperative and no distress  Mental Status:  Oriented to person and place but not time.    Lungs:  clear to auscultation bilaterally, normal effort  Heart:  regular rate and rhythm, no murmur  Abdomen:  soft, nontender, nondistended, normal bowel sounds, no masses, hepatomegaly, splenomegaly  Extremities:  no edema, redness, tenderness in the calves  Skin:  no gross lesions, rashes, induration    Assessment:     Hospital Problems             Last Modified POA    * (Principal) Transient global amnesia 1/29/2023 Yes    Depression with anxiety 1/29/2023 Yes       Plan:     Transient global amnesia   -Likely secondary to stress from recent separation from her wife   -CT and CTA head and neck negative for acute intracranial abnormality   -MRI brain pending   -Consult neurology; appreciate recommendations      Depression and anxiety   -Continue home escitalopram     Florian Turpin MD  1/30/2023  7:23 AM

## 2023-01-30 NOTE — PLAN OF CARE
Problem: Discharge Planning  Goal: Discharge to home or other facility with appropriate resources  Outcome: Progressing     Problem: Pain  Goal: Verbalizes/displays adequate comfort level or baseline comfort level  Outcome: Progressing  Flowsheets (Taken 1/30/2023 0400)  Verbalizes/displays adequate comfort level or baseline comfort level:   Encourage patient to monitor pain and request assistance   Assess pain using appropriate pain scale   Administer analgesics based on type and severity of pain and evaluate response   Implement non-pharmacological measures as appropriate and evaluate response     Problem: Safety - Adult  Goal: Free from fall injury  Outcome: Progressing     Problem: ABCDS Injury Assessment  Goal: Absence of physical injury  Outcome: Progressing

## 2023-01-31 ENCOUNTER — PATIENT MESSAGE (OUTPATIENT)
Dept: PRIMARY CARE CLINIC | Age: 58
End: 2023-01-31

## 2023-01-31 VITALS
DIASTOLIC BLOOD PRESSURE: 67 MMHG | SYSTOLIC BLOOD PRESSURE: 121 MMHG | HEIGHT: 63 IN | OXYGEN SATURATION: 95 % | WEIGHT: 194.67 LBS | BODY MASS INDEX: 34.49 KG/M2 | TEMPERATURE: 98.4 F | RESPIRATION RATE: 16 BRPM | HEART RATE: 81 BPM

## 2023-01-31 DIAGNOSIS — F33.1 MODERATE EPISODE OF RECURRENT MAJOR DEPRESSIVE DISORDER (HCC): ICD-10-CM

## 2023-01-31 PROBLEM — G47.33 OSA (OBSTRUCTIVE SLEEP APNEA): Status: ACTIVE | Noted: 2023-01-31

## 2023-01-31 LAB
ABSOLUTE EOS #: 0.2 K/UL (ref 0–0.4)
ABSOLUTE LYMPH #: 1.3 K/UL (ref 1–4.8)
ABSOLUTE MONO #: 0.8 K/UL (ref 0.1–1.2)
ANION GAP SERPL CALCULATED.3IONS-SCNC: 7 MMOL/L (ref 9–17)
BASOPHILS # BLD: 1 % (ref 0–2)
BASOPHILS ABSOLUTE: 0.1 K/UL (ref 0–0.2)
BUN BLDV-MCNC: 9 MG/DL (ref 6–20)
CALCIUM SERPL-MCNC: 9.3 MG/DL (ref 8.6–10.4)
CHLORIDE BLD-SCNC: 106 MMOL/L (ref 98–107)
CO2: 30 MMOL/L (ref 20–31)
CREAT SERPL-MCNC: 0.59 MG/DL (ref 0.5–0.9)
EOSINOPHILS RELATIVE PERCENT: 3 % (ref 1–4)
GFR SERPL CREATININE-BSD FRML MDRD: >60 ML/MIN/1.73M2
GLUCOSE BLD-MCNC: 95 MG/DL (ref 70–99)
HCT VFR BLD CALC: 42 % (ref 36–46)
HEMOGLOBIN: 13.8 G/DL (ref 12–16)
LYMPHOCYTES # BLD: 26 % (ref 24–44)
MCH RBC QN AUTO: 30.9 PG (ref 26–34)
MCHC RBC AUTO-ENTMCNC: 32.8 G/DL (ref 31–37)
MCV RBC AUTO: 94.3 FL (ref 80–100)
MONOCYTES # BLD: 15 % (ref 2–11)
PDW BLD-RTO: 13.7 % (ref 12.5–15.4)
PLATELET # BLD: 185 K/UL (ref 140–450)
PMV BLD AUTO: 7.5 FL (ref 6–12)
POTASSIUM SERPL-SCNC: 4.4 MMOL/L (ref 3.7–5.3)
RBC # BLD: 4.46 M/UL (ref 4–5.2)
SEG NEUTROPHILS: 55 % (ref 36–66)
SEGMENTED NEUTROPHILS ABSOLUTE COUNT: 2.9 K/UL (ref 1.8–7.7)
SODIUM BLD-SCNC: 143 MMOL/L (ref 135–144)
WBC # BLD: 5.2 K/UL (ref 3.5–11)

## 2023-01-31 PROCEDURE — 36415 COLL VENOUS BLD VENIPUNCTURE: CPT

## 2023-01-31 PROCEDURE — 2580000003 HC RX 258: Performed by: STUDENT IN AN ORGANIZED HEALTH CARE EDUCATION/TRAINING PROGRAM

## 2023-01-31 PROCEDURE — 99238 HOSP IP/OBS DSCHRG MGMT 30/<: CPT | Performed by: FAMILY MEDICINE

## 2023-01-31 PROCEDURE — 85025 COMPLETE CBC W/AUTO DIFF WBC: CPT

## 2023-01-31 PROCEDURE — 80048 BASIC METABOLIC PNL TOTAL CA: CPT

## 2023-01-31 PROCEDURE — 6360000002 HC RX W HCPCS: Performed by: STUDENT IN AN ORGANIZED HEALTH CARE EDUCATION/TRAINING PROGRAM

## 2023-01-31 PROCEDURE — 99222 1ST HOSP IP/OBS MODERATE 55: CPT | Performed by: PSYCHIATRY & NEUROLOGY

## 2023-01-31 PROCEDURE — 6370000000 HC RX 637 (ALT 250 FOR IP): Performed by: STUDENT IN AN ORGANIZED HEALTH CARE EDUCATION/TRAINING PROGRAM

## 2023-01-31 RX ORDER — ESCITALOPRAM OXALATE 20 MG/1
20 TABLET ORAL DAILY
Qty: 90 TABLET | Refills: 2 | Status: SHIPPED | OUTPATIENT
Start: 2023-01-31 | End: 2023-05-01

## 2023-01-31 RX ADMIN — ESCITALOPRAM OXALATE 20 MG: 10 TABLET ORAL at 08:40

## 2023-01-31 RX ADMIN — ACETAMINOPHEN 650 MG: 325 TABLET ORAL at 08:32

## 2023-01-31 RX ADMIN — ENOXAPARIN SODIUM 40 MG: 100 INJECTION SUBCUTANEOUS at 08:40

## 2023-01-31 RX ADMIN — SODIUM CHLORIDE, PRESERVATIVE FREE 10 ML: 5 INJECTION INTRAVENOUS at 08:40

## 2023-01-31 RX ADMIN — ASPIRIN 81 MG: 81 TABLET, COATED ORAL at 08:40

## 2023-01-31 ASSESSMENT — ENCOUNTER SYMPTOMS
NAUSEA: 0
SHORTNESS OF BREATH: 0
COUGH: 0
RHINORRHEA: 0
CHEST TIGHTNESS: 0
BLOOD IN STOOL: 0
ABDOMINAL PAIN: 0
DIARRHEA: 0
WHEEZING: 0
VOMITING: 0
CONSTIPATION: 0

## 2023-01-31 ASSESSMENT — PAIN SCALES - GENERAL: PAINLEVEL_OUTOF10: 6

## 2023-01-31 ASSESSMENT — PAIN DESCRIPTION - DESCRIPTORS: DESCRIPTORS: ACHING

## 2023-01-31 ASSESSMENT — PAIN DESCRIPTION - LOCATION: LOCATION: HEAD

## 2023-01-31 NOTE — CONSULTS
Neurology Consult Note        Reason for Consult:  transient memory loss  Requesting Physician:  Dr Reagan Patel:  memory loss    History Obtained From:  patient, spouse       HISTORY OF PRESENT ILLNESS:    This is a 61 y.o. female with a past medical history of depression who presented to the emergency department on 1/29/2023 complaining of confusion and amnesia. The patient has no recollection of how she got to the hospital but apparently called EMS multiple times this morning. In the ED, the patient was confused and alert and oriented only to person and place. She states that she been under a great deal of stress lately as she recently  from her wife. Per ER documentation, the patient called her ex-wife at least twelve times that morning and her boss several times as well. She has no recollection of doing this or calling EMS. Laboratory analysis was unremarkable. CT and CTA of the head and neck was done and was negative for an acute intracranial abnormality. Neurology was consulted in the ED and felt that the patient's confusion is secondary to transient global amnesia secondary to stress and recommended admission for MRI of the brain as well as inpatient neurology consultation. The patient is admitted to internal medicine for further management. Presently she states she is back to normal but tired. She states the overall length of amnesia was about 8 hours. Interesting she had a similar event about a year ago which lasted about 2 hours but she was not completely amnestic and also reportedly had a low blood sugar.            Past Medical History:        Diagnosis Date    Anxiety     Asthma     Former     Depression     Minor     Sleep apnea     dont use a machine ( lost weight )     Past Surgical History:        Procedure Laterality Date    GALLBLADDER SURGERY      HYSTERECTOMY, VAGINAL      OVARIES ONLY      Current Medications:   Current Facility-Administered Medications: 0.9 % sodium chloride bolus, 80 mL, IntraVENous, Once  escitalopram (LEXAPRO) tablet 20 mg, 20 mg, Oral, Daily  sodium chloride flush 0.9 % injection 5-40 mL, 5-40 mL, IntraVENous, 2 times per day  sodium chloride flush 0.9 % injection 5-40 mL, 5-40 mL, IntraVENous, PRN  0.9 % sodium chloride infusion, , IntraVENous, PRN  enoxaparin (LOVENOX) injection 40 mg, 40 mg, SubCUTAneous, Daily  ondansetron (ZOFRAN-ODT) disintegrating tablet 4 mg, 4 mg, Oral, Q8H PRN **OR** ondansetron (ZOFRAN) injection 4 mg, 4 mg, IntraVENous, Q6H PRN  polyethylene glycol (GLYCOLAX) packet 17 g, 17 g, Oral, Daily PRN  acetaminophen (TYLENOL) tablet 650 mg, 650 mg, Oral, Q6H PRN **OR** acetaminophen (TYLENOL) suppository 650 mg, 650 mg, Rectal, Q6H PRN  aspirin EC tablet 81 mg, 81 mg, Oral, Daily  Allergies:  Latex, Pcn [penicillins], and Bactrim [sulfamethoxazole-trimethoprim]    Social History:  TOBACCO:   reports that she quit smoking about 24 years ago. Her smoking use included cigarettes. She has a 4.50 pack-year smoking history. She has never used smokeless tobacco.  ETOH:   reports current alcohol use. DRUGS:   reports no history of drug use.         Family History:       Problem Relation Age of Onset    Arrhythmia Mother     Cancer Mother     Depression Mother     High Cholesterol Mother     Diabetes Mother     Miscarriages / [place] Mother     Other Mother     Arrhythmia Father     Heart Disease Father     High Cholesterol Father     Other Maternal Grandmother        REVIEW OF SYSTEMS:  CONSTITUTIONAL:  negative for fevers, chills, sweats, fatigue, weight loss  HEENT:  negative for vision, hearing changes, runny nose, throat pain  RESPIRATORY:  negative for shortness of breath, cough, congestion, wheezing  CARDIOVASCULAR:  negative for chest pain, palpitations  GASTROINTESTINAL:  negative for nausea, vomiting, diarrhea, constipation, change in bowel habits, abdominal pain   GENITOURINARY:  negative for difficulty of urination, burning with urination, frequency   INTEGUMENT:  negative for rash, skin lesions, easy bruising   HEMATOLOGIC/LYMPHATIC:  negative for swelling/edema   ALLERGIC/IMMUNOLOGIC:  negative for urticaria , itching  ENDOCRINE:  negative increase in drinking, increase in urination, hot or cold intolerance  MUSCULOSKELETAL:  negative joint pains, muscle aches, swelling of joints  NEUROLOGICAL:  negative for headaches, dizziness, lightheadedness, numbness, pain, tingling extremities  BEHAVIOR/PSYCH:  Positive for depression and anxiety. PHYSICAL EXAM:    Vitals:  /67   Pulse 81   Temp 98.4 °F (36.9 °C) (Oral)   Resp 16   Ht 5' 3\" (1.6 m)   Wt 194 lb 10.7 oz (88.3 kg)   SpO2 95%   BMI 34.48 kg/m²      General Exam:  Overweight body habitus, no acute distress    HEENT:  Normocephalic, atraumatic  Eyes: conjunctiva non-injected, sclera anicteric  Mucous membranes of normal color and hydration status  Dentition: OK  Neck supple. Neurologic exam:     Mental status: alert and oriented to person, place, time and situation  No overt visuospatial neglect or gaze preference  Language with normal fluency and comprehension to simple commands. She able name and locate 3 items I placed around the room earlier    Cranial nerves:  Pupils equal round and reactive to light, no eyelid ptosis  Extraocular movements: intact and full.   Face is symmetric to movement and sensation  Hearing is intact to conversation  Tongue midline, no dysarthria or dysphonia    Motor exam:  Strength   (Right/Left)  Deltoids           5/5  Biceps             5/5  Triceps            5/5  Wrist ext          5/5  Finger ext        5/5  Hand intrin       5/5    Hip flex            5/5  Knee ext          5/5  Ankle dorsi       5/5      DTRs           (right/left)  Biceps                 2/2  Brachorad           2/2  Patellar                2/2  Ankles                 2/2    Coordination  Finger nose finger intact bilaterally    Sensation: intact to LT and vibratory sense in hands and feet    No pronator drift        DATA  EMR, labs and MRI reviewed    IMPRESSION:   This is a 62 y/o WF with about an 8 hour period of anterograde amnesia. Her exam is normal as is her MRI. Overall the clinical picture remains consistent with transient global amnesia              RECOMMENDATIONS:   OK for d/c to home without limitations. Britney Willson. Elizabeth Lennon M.D.   Clinical Neurophysiologist  Neuromuscular Medicine

## 2023-01-31 NOTE — DISCHARGE SUMMARY
[facility]  Office: [address] Lori Ricci [facility] DO, Debbie Alfaro MD, Alyssa Chandler MD, Bruno España MD, [place]man [place] MD, Carlton Mckinnon MD, Stefani Cagle MD, Bambi Talley MD, Jennifer Dia MD, Juan Nguyen MD, Ashleigh Elaine DO, Leana Arauz MD, Emily Hatfield MD, Talita Vang DO, Sushma Harden MD,  Ivonne Pham DO, Deena Holliday MD, Latanya Abad MD, Christiane Gregg CNP, [facility] CNP, Rachelle Evangelista CNP, Fernando Plunkett CNS, Kalani Landa CNP, Olga Camacho CNP, Ashley Kaufman CNP, Laury Moe CNP, Armin Lira CNP, Eden Tracy PA-C, Susan Quinn CNP, Renetta Jesus CNP, Lisa Berrios CNP, [facility] CNP, [facility], Maura Tran, [facility] [place]      Discharge Summary     Patient ID: Amelia Boss  :  1965   MRN: [id number]     ACCOUNT:  [de-identified]   Patient Location : [address] [place] [address]  Patient's PCP: Bard Rogelio MD  Admit Date: 2023   Discharge Date:  23     Length of Stay: 1  Code Status:  Full Code  Admitting Physician: Rayshawn Orta DO  Discharge Physician: Bruno España MD     Active Discharge Diagnosis :     Primary Problem  Transient global amnesia      Hospital Problems  Active Hospital Problems    Diagnosis Date Noted    Transient global amnesia [G45.4] 2023     Priority: Medium    Depression with anxiety [F41.8] 2021       Admission Condition:  fair     Discharged Condition: stable    Hospital Stay:     Hospital Course:  Amelia Boss is a 61 y.o. female who was admitted for the management of   Transient global amnesia , presented to ER with Altered Mental Status (Pt brought by EMS. Pt called them out 2 times but does not remember. )  Patient presented to emergency room with altered mental status brought in by EMS. Apparently patient had called EMS 2 times however has no recollection of calling EMS.   Patient had been under increased stress lately and had called [employer] about 12 times for help. She has underlying history of mild depression, asthma, anxiety disorder, sleep apnea however does not use CPAP machine. Initial evaluation showed temperature 99 °F, heart rate 117, blood pressure 161/91 mmHg. She was saturating 99% on room air. She was oriented to person and place on presentation. Lab evaluation showed hypokalemia 3.3 otherwise normal blood work, ammonia was 27, glucose 175, WBC 8.4. U tox positive for cannabinoids barbiturates opiates. Alcohol level was <0.017. CT head, CTA head and neck, MRI brain was negative. HPI - Patient is alert, oriented. She denies any headaches. She has no focal weakness, speech changes, swallowing difficulty. Work-up was negative. Significant therapeutic interventions:   Transient global amnesia -consider to management. Negative brain imaging. Recommend lifestyle changes with weight loss, daily exercise. Patient has untreated sleep apnea, I recommended her to repeat sleep apnea study. Depression -on Lexapro     Review of Systems   Constitutional:  Negative for appetite change, fatigue, fever and unexpected weight change. HENT:  Negative for congestion, rhinorrhea and sneezing. Eyes:  Negative for visual disturbance. Respiratory:  Negative for cough, chest tightness, shortness of breath and wheezing. Cardiovascular:  Negative for chest pain and palpitations. Gastrointestinal:  Negative for abdominal pain, blood in stool, constipation, diarrhea, nausea and vomiting. Genitourinary:  Negative for dysuria, enuresis, frequency and hematuria. Musculoskeletal:  Negative for arthralgias and myalgias. Skin:  Negative for rash. Neurological:  Negative for dizziness, weakness, light-headedness and headaches. Hematological:  Does not bruise/bleed easily. Psychiatric/Behavioral:  Negative for dysphoric mood and sleep disturbance.       Physical Exam  Vitals and nursing note reviewed. Constitutional:       General: She is not in acute distress. Appearance: She is well-developed. She is not diaphoretic. HENT:      Mouth/Throat:      Pharynx: No oropharyngeal exudate. Eyes:      General: No scleral icterus. Conjunctiva/sclera: Conjunctivae normal.      Pupils: Pupils are equal, round, and reactive to light. Neck:      Thyroid: No thyromegaly. Vascular: No JVD. Cardiovascular:      Rate and Rhythm: Normal rate and regular rhythm. Heart sounds: Normal heart sounds. No murmur heard. No friction rub. No gallop. Pulmonary:      Effort: Pulmonary effort is normal. No respiratory distress. Breath sounds: No wheezing or rales. Abdominal:      General: Bowel sounds are normal. There is no distension. Palpations: Abdomen is soft. There is no mass. Tenderness: There is no abdominal tenderness. There is no guarding or rebound. Lymphadenopathy:      Cervical: No cervical adenopathy. Neurological:      Mental Status: She is alert and oriented to person, place, and time. Cranial Nerves: No cranial nerve deficit. Motor: No abnormal muscle tone.        Significant Diagnostic Studies:   Labs / Micro:/Radiology  Recent Labs     01/31/23  0606 01/30/23  0546 01/29/23  1340   WBC 5.2 6.1 8.4   HGB 13.8 13.0 14.3   HCT 42.0 38.9 42.8   MCV 94.3 94.0 94.1    185 198     Labs Renal Latest Ref Rng & Units 1/31/2023 1/30/2023 1/29/2023   BUN 6 - 20 mg/dL 9 7 13   Cr 0.50 - 0.90 mg/dL 0.59 0.52 0.70   K 3.7 - 5.3 mmol/L 4.4 4.4 3.3(L)   Na 135 - 144 mmol/L 143 141 139     Lab Results   Component Value Date    ALT 21 01/29/2023    AST 21 01/29/2023    ALKPHOS 84 01/29/2023    BILITOT 0.4 01/29/2023     No results found for: TSHFT4, TSH  No results found for: HAV, HEPAIGM, HEPBIGM, HEPBCAB, HBEAG, HEPCAB  Lab Results   Component Value Date/Time    COLORU Yellow 01/30/2023 10:57 AM    NITRU NEGATIVE 01/30/2023 10:57 AM    GLUCOSEU NEGATIVE 01/30/2023 10:57 AM    KETUA NEGATIVE 01/30/2023 10:57 AM    UROBILINOGEN Normal 01/30/2023 10:57 AM    BILIRUBINUR NEGATIVE 01/30/2023 10:57 AM     No results found for: LABA1C  No results found for: EAG  Lab Results   Component Value Date    INR 1.0 01/29/2023    PROTIME 11.0 01/29/2023       CT HEAD WO CONTRAST    Result Date: 1/29/2023  1. No acute intracranial abnormality. 2. No evidence of large vessel occlusion or significant stenosis in major arteries of the head and neck. The findings were sent to the Radiology Results [address] at 1:51 p.m. on 1/29/2023 in subsequently relayed to Dr. Isadora Sher at 1:54 p.m. XR CHEST PORTABLE    Result Date: 1/29/2023  No acute cardiopulmonary findings. CTA HEAD NECK W CONTRAST    Result Date: 1/29/2023  1. No acute intracranial abnormality. 2. No evidence of large vessel occlusion or significant stenosis in major arteries of the head and neck. The findings were sent to the Radiology Results [address] at 1:51 p.m. on 1/29/2023 in subsequently relayed to Dr. Isadora Sher at 1:54 p.m. MRI BRAIN WO CONTRAST    Result Date: 1/30/2023  1. No acute intracranial abnormality. No acute infarct. 2. Minimal chronic microvascular ischemic changes. Consultations:    Consults:     Final Specialist Recommendations/Findings:   IP CONSULT TO NEUROLOGY      The patient was seen and examined on day of discharge and this discharge summary is in conjunction with any daily progress note from day of discharge. Discharge plan:     Disposition: Home    Physician Follow Up:     Prashant Medina MD  [address]  [facility] [address]  [phone]    Follow up  As needed       Requiring Further Evaluation/Follow Up POST HOSPITALIZATION/Incidental Findings:     Diet: regular diet    Activity: As tolerated. Instructions to Patient: Follow-up with primary care physician. No need for long-term aspirin.     Discharge Medications:      Medication List        START taking these medications      aspirin 81 MG EC tablet  Take 1 tablet by mouth daily            CONTINUE taking these medications      Citrucel 500 MG Tabs  Generic drug: methylcellulose     escitalopram 20 MG tablet  Commonly known as: Lexapro  Take 1 tablet by mouth daily     therapeutic multivitamin-minerals tablet  Take 1 tablet by mouth daily     VITAMIN D3 PO               Where to Get Your Medications        These medications were sent to [id number]  [place], [address] [place] [address]      Phone: [phone]   aspirin 81 MG EC tablet         Time Spent on discharge is  25 mins in patient examination, evaluation, counseling as well as medication reconciliation, prescriptions for required medications, discharge plan and follow up. Electronically signed by   Ralph Serra MD  1/31/2023        Thank you Dr. Su Knight MD for the opportunity to be involved in this patient's care.

## 2023-01-31 NOTE — PLAN OF CARE
Problem: Safety - Adult  Goal: Free from fall injury  1/31/2023 1031 by Diana Carrillo RN  Outcome: Progressing  Pt fall risk, fall band present, falling star, safety alarm activated and in use as needed. Hourly rounding performed. Pt encouraged to use call light. See Dolphus Ormond fall risk assessment.

## 2023-01-31 NOTE — PROGRESS NOTES
Discharge teaching and instructions completed with patient using teachback method. AVS reviewed. Patient voiced understanding regarding prescriptions, follow up appointments, and care of self at home. Discharged home with via wheel chair with friend.

## 2023-01-31 NOTE — PLAN OF CARE
Problem: Discharge Planning  Goal: Discharge to home or other facility with appropriate resources  Outcome: Progressing  Flowsheets (Taken 1/30/2023 2035)  Discharge to home or other facility with appropriate resources: Identify barriers to discharge with patient and caregiver     Problem: Pain  Goal: Verbalizes/displays adequate comfort level or baseline comfort level  Outcome: Progressing  Flowsheets (Taken 1/31/2023 0032)  Verbalizes/displays adequate comfort level or baseline comfort level:   Encourage patient to monitor pain and request assistance   Assess pain using appropriate pain scale   Administer analgesics based on type and severity of pain and evaluate response   Implement non-pharmacological measures as appropriate and evaluate response     Problem: Safety - Adult  Goal: Free from fall injury  Outcome: Progressing     Problem: ABCDS Injury Assessment  Goal: Absence of physical injury  Outcome: Progressing

## 2023-01-31 NOTE — DISCHARGE INSTR - DIET

## 2023-02-01 ENCOUNTER — OFFICE VISIT (OUTPATIENT)
Dept: PRIMARY CARE CLINIC | Age: 58
End: 2023-02-01
Payer: COMMERCIAL

## 2023-02-01 ENCOUNTER — HOSPITAL ENCOUNTER (OUTPATIENT)
Age: 58
Setting detail: SPECIMEN
Discharge: HOME | End: 2023-02-01

## 2023-02-01 VITALS
WEIGHT: 188.4 LBS | SYSTOLIC BLOOD PRESSURE: 126 MMHG | DIASTOLIC BLOOD PRESSURE: 82 MMHG | HEART RATE: 88 BPM | OXYGEN SATURATION: 95 % | HEIGHT: 63 IN | BODY MASS INDEX: 33.38 KG/M2

## 2023-02-01 DIAGNOSIS — R82.998 LEUKOCYTES IN URINE: ICD-10-CM

## 2023-02-01 DIAGNOSIS — F41.8 DEPRESSION WITH ANXIETY: ICD-10-CM

## 2023-02-01 DIAGNOSIS — G45.4 TRANSIENT GLOBAL AMNESIA: Primary | ICD-10-CM

## 2023-02-01 DIAGNOSIS — G47.33 OSA (OBSTRUCTIVE SLEEP APNEA): ICD-10-CM

## 2023-02-01 DIAGNOSIS — Z79.899 MEDICATION MANAGEMENT: ICD-10-CM

## 2023-02-01 LAB
BILIRUBIN, POC: ABNORMAL
BLOOD URINE, POC: ABNORMAL
CLARITY, POC: CLEAR
COLOR, POC: YELLOW
GLUCOSE URINE, POC: ABNORMAL
KETONES, POC: ABNORMAL
LEUKOCYTE EST, POC: ABNORMAL
NITRITE, POC: ABNORMAL
PH, POC: 6
PROTEIN, POC: ABNORMAL
SPECIFIC GRAVITY, POC: >=1.03
UROBILINOGEN, POC: 0.2

## 2023-02-01 PROCEDURE — 81003 URINALYSIS AUTO W/O SCOPE: CPT | Performed by: FAMILY MEDICINE

## 2023-02-01 PROCEDURE — 99214 OFFICE O/P EST MOD 30 MIN: CPT | Performed by: FAMILY MEDICINE

## 2023-02-01 SDOH — ECONOMIC STABILITY: HOUSING INSECURITY
IN THE LAST 12 MONTHS, WAS THERE A TIME WHEN YOU DID NOT HAVE A STEADY PLACE TO SLEEP OR SLEPT IN A SHELTER (INCLUDING NOW)?: NO

## 2023-02-01 SDOH — ECONOMIC STABILITY: FOOD INSECURITY: WITHIN THE PAST 12 MONTHS, YOU WORRIED THAT YOUR FOOD WOULD RUN OUT BEFORE YOU GOT MONEY TO BUY MORE.: NEVER TRUE

## 2023-02-01 SDOH — ECONOMIC STABILITY: FOOD INSECURITY: WITHIN THE PAST 12 MONTHS, THE FOOD YOU BOUGHT JUST DIDN'T LAST AND YOU DIDN'T HAVE MONEY TO GET MORE.: NEVER TRUE

## 2023-02-01 SDOH — ECONOMIC STABILITY: INCOME INSECURITY: HOW HARD IS IT FOR YOU TO PAY FOR THE VERY BASICS LIKE FOOD, HOUSING, MEDICAL CARE, AND HEATING?: NOT HARD AT ALL

## 2023-02-01 ASSESSMENT — ENCOUNTER SYMPTOMS: RESPIRATORY NEGATIVE: 1

## 2023-02-01 NOTE — PATIENT INSTRUCTIONS
Patient Education        A Healthy Lifestyle: Care Instructions  A healthy lifestyle can help you feel good, have more energy, and stay at a weight that's healthy for you. You can share a healthy lifestyle with your friends and family. And you can do it on your own. Eat meals with your friends or family. You could try cooking together. Plan activities with other people. Go for a walk with a friend, try a free online fitness class, or join a sports league. Eat a variety of healthy foods. These include fruits, vegetables, whole grains, low-fat dairy, and lean protein. Choose healthy portions of food. You can use the Nutrition Facts label on food packages as a guide. Eat more fruits and vegetables. You could add vegetables to sandwiches or add fruit to cereal.   Drink water when you are thirsty. Limit soda, juice, and sports drinks. Try to exercise most days. Aim for at least 2½ hours of exercise each week. Keep moving. Work in the garden or take your dog on a walk. Use the stairs instead of the elevator. If you use tobacco or nicotine, try to quit. Ask your doctor about programs and medicines to help you quit. Limit alcohol. Men should have no more than 2 drinks a day. Women should have no more than 1. For some people, no alcohol is the best choice. Follow-up care is a key part of your treatment and safety. Be sure to make and go to all appointments, and call your doctor if you are having problems. It's also a good idea to know your test results and keep a list of the medicines you take. Where can you learn more? Go to http://www.loving.com/ and enter U807 to learn more about \"A Healthy Lifestyle: Care Instructions. \"  Current as of: March 9, 2022               Content Version: 13.5  © 6318-6307 [employer]. Care instructions adapted under license by [employer].  If you have questions about a medical condition or this instruction, always ask your healthcare professional. [address] any warranty or liability for your use of this information.

## 2023-02-01 NOTE — PROGRESS NOTES
[address] [address]  Dept: [phone]    Perlie Epley is a 61 y.o. female Established patient, who presents today for her medical conditions/complaintsas noted below. Chief Complaint   Patient presents with    Other     Hospital f/u    Immunizations     Pt declined shingrix and dtap       HPI:     HPI  Similar sx of the amnesia episode a year ago, was confused as to what she was supposed to do or go. That  episode lasted shorter  Has been working a lot of hours  Increased stress with separation from her wife/partner and increased work hrs. Hx of sleep apnea, has not been getting treated for it.      Reviewed prior notes Neurology  Reviewed previous Labs, Imaging, and Hospital Records    LDL Cholesterol (mg/dL)   Date Value   2022 104   2021 93       (goal LDL is <100)   AST (U/L)   Date Value   2023 21     ALT (U/L)   Date Value   2023 21     BUN (mg/dL)   Date Value   2023 9     BP Readings from Last 3 Encounters:   23 126/82   23 121/67   23 122/84          (goal 120/80)    Past Medical History:   Diagnosis Date    Anxiety     Asthma     Former     Depression     Minor     Sleep apnea     dont use a machine ( lost weight )      Past Surgical History:   Procedure Laterality Date    GALLBLADDER SURGERY      HYSTERECTOMY, VAGINAL      OVARIES ONLY        Family History   Problem Relation Age of Onset    Arrhythmia Mother     Cancer Mother     Depression Mother     High Cholesterol Mother     Diabetes Mother     Miscarriages / [place] Mother     Other Mother     Arrhythmia Father     Heart Disease Father     High Cholesterol Father     Other Maternal Grandmother        Social History     Tobacco Use    Smoking status: Former     Packs/day: 1.50     Years: 3.00     Pack years: 4.50     Types: Cigarettes     Quit date: 1998     Years since quittin.1    Smokeless tobacco: Never   Substance Use Topics    Alcohol use: Yes     Comment: Once in awhile       Current Outpatient Medications   Medication Sig Dispense Refill    escitalopram (LEXAPRO) 20 MG tablet Take 1 tablet by mouth daily 90 tablet 2    aspirin 81 MG EC tablet Take 1 tablet by mouth daily 30 tablet 3    Cholecalciferol (VITAMIN D3 PO) Take by mouth      methylcellulose (CITRUCEL) 500 MG TABS Take by mouth      Multiple Vitamins-Minerals (THERAPEUTIC MULTIVITAMIN-MINERALS) tablet Take 1 tablet by mouth daily       No current facility-administered medications for this visit. Allergies   Allergen Reactions    Latex     Pcn [Penicillins] Diarrhea    Bactrim [Sulfamethoxazole-Trimethoprim] Hives and Rash       Health Maintenance   Topic Date Due    HIV screen  Never done    Hepatitis C screen  Never done    DTaP/Tdap/Td vaccine (1 - Tdap) Never done    Shingles vaccine (1 of 2) Never done    Breast cancer screen  03/30/2023    Depression Monitoring  01/04/2024    Colorectal Cancer Screen  10/24/2024    Lipids  07/11/2027    Flu vaccine  Completed    COVID-19 Vaccine  Completed    Hepatitis A vaccine  Aged Out    Hib vaccine  Aged Out    Meningococcal (ACWY) vaccine  Aged Out    Pneumococcal 0-64 years Vaccine  Aged Out       Subjective:      Review of Systems   Constitutional:  Positive for fatigue. Respiratory: Negative. Objective:     /82   Pulse 88   Ht 5' 3\" (1.6 m)   Wt 188 lb 6.4 oz (85.5 kg)   SpO2 95%   BMI 33.37 kg/m²   Physical Exam  Vitals and nursing note reviewed. Constitutional:       General: She is not in acute distress. Appearance: She is well-developed. She is not ill-appearing. HENT:      Head: Normocephalic and atraumatic. Right Ear: External ear normal.      Left Ear: External ear normal.   Eyes:      General: No scleral icterus. Right eye: No discharge. Left eye: No discharge.       Conjunctiva/sclera: Conjunctivae normal.   Neck:      Thyroid: No thyromegaly.      Trachea: No tracheal deviation.   Cardiovascular:      Rate and Rhythm: Normal rate and regular rhythm.      Heart sounds: Normal heart sounds.   Pulmonary:      Effort: Pulmonary effort is normal. No respiratory distress.      Breath sounds: Normal breath sounds. No wheezing.   Lymphadenopathy:      Cervical: No cervical adenopathy.   Skin:     General: Skin is warm.      Findings: No rash.   Neurological:      Mental Status: She is alert and oriented to person, place, and time.   Psychiatric:         Mood and Affect: Mood normal.         Behavior: Behavior normal.         Thought Content: Thought content normal.       Assessment:       Diagnosis Orders   1. Transient global amnesia  POCT Urinalysis No Micro (Auto)    Sleep Study with PAP Titration    TSH With Reflex Ft4    Vitamin B12      2. Medication management  POCT Urinalysis No Micro (Auto)    TSH With Reflex Ft4    Vitamin B12      3. Depression with anxiety        4. YADIRA (obstructive sleep apnea)  Sleep Study with PAP Titration             Plan:      Return in about 3 months (around 5/1/2023).  Sleep apnea questions  She is still short handed at work. Working a lot  The transient amnesia could be stress and/or sleep apnea related.     Orders Placed This Encounter   Procedures    TSH With Reflex Ft4     Standing Status:   Future     Standing Expiration Date:   2/1/2024    Vitamin B12     Standing Status:   Future     Standing Expiration Date:   2/1/2024    POCT Urinalysis No Micro (Auto)    Sleep Study with PAP Titration     Standing Status:   Future     Standing Expiration Date:   8/1/2023     Order Specific Question:   Sleep Study Titration Type     Answer:   Split Night Study (Baseline followed by PAP Titration)     Order Specific Question:   Location For Sleep Study     Answer:   [facility]     Order Specific Question:   Select Sleep Lab Location     Answer:   [facility]     Order Specific Question:   Pre-Study Patient Questions:  Answer:   Complains of daytime sleepiness     Order Specific Question:   Pre-Study Patient Questions: Answer:   Impaired attention, concentration, or memory due to poor sleep     No orders of the defined types were placed in this encounter. Patient given educationalmaterials - see patient instructions. Discussed use, benefit, and side effectsof prescribed medications. All patient questions answered. Pt voiced understanding. Reviewed health maintenance. Instructed to continue current medications, diet andexercise. Patient agreed with treatment plan. Follow up as directed.      Electronicallysigned by Marshal Jarquin MD on 2/1/2023 at 10:08 AM

## 2023-02-01 NOTE — TELEPHONE ENCOUNTER
Estefanía Agosto MA 1/31/2023 5:11 PM EST      ----- Message -----  From: Henrietta Shea  Sent: 1/31/2023 5:10 PM EST  To: Natalie Barrera Clinical Staff  Subject: Rest after hospital     I received a clean bill of health from [facility]. Dr. Guzman got away from me before I could ask for a couple days rest before returning to work. The RN on the floor said I should follow up with you. I feel ok, just still exhausted and could use a couple days downtime. Will you please help? I did schedule an appointment for tomorrow morning if necessary. Thank you.     Elva

## 2023-02-02 ENCOUNTER — PATIENT MESSAGE (OUTPATIENT)
Dept: PRIMARY CARE CLINIC | Age: 58
End: 2023-02-02

## 2023-02-02 DIAGNOSIS — R82.998 LEUKOCYTES IN URINE: ICD-10-CM

## 2023-02-02 NOTE — PROGRESS NOTES
Physician Progress Note      Arpita Ivy  CSN #:                  [id number]  :                       1965  ADMIT DATE:       2023 1:23 PM  [address] [place] DATE:        2023 3:26 PM  RESPONDING  PROVIDER #:        Bruno España MD          QUERY TEXT:    Pt admitted with transient global amnesia. Pt noted to have multiple recent   stressors. If possible, please document in progress notes and discharge   summary the relationship, if any, between transient global amnesia and   stressors. The medical record reflects the following:  Risk Factors: Recent stressors: She works over 50 hours a week and is    from her wife. Hx depression. Untreated sleep apnea. Clinical Indicators: The patient has no recollection of how she got to the   hospital but apparently called EMS multiple times. Negative MRI brain, CTH,   CTA H/N, CXR. Normal glucose no UTI Na 139. Neurology consult note: Neurology   was consulted in the ED and felt that the patient's confusion is secondary to   transient global amnesia secondary to stress. Overall the clinical picture   remains consistent with transient global amnesia. Treatment: Neuro consult, MRI, resume home Lexapro, labs/monitoring. Recommend   lifestyle changes with weight loss, daily exercise. Charmaine Russ RN, CDS  [email or web address]  Options provided:  -- Transient global amnesia due to somatoform disorder secondary to anxiety   and stress  -- Transient global amnesia due to anxiety and stress  -- Transient global amnesia unrelated to recent stressors  -- Other - I will add my own diagnosis  -- Disagree - Not applicable / Not valid  -- Disagree - Clinically unable to determine / Unknown  -- Refer to Clinical Documentation Reviewer    PROVIDER RESPONSE TEXT:    This patient has transient global amnesia due to anxiety and stress. Query created by: Stan Blanco on 2023 12:42 PM      Electronically signed by:   Broderick Rosario [facility] MD 2/2/2023 11:38 AM

## 2023-02-03 DIAGNOSIS — Z79.899 MEDICATION MANAGEMENT: ICD-10-CM

## 2023-02-03 DIAGNOSIS — G45.4 TRANSIENT GLOBAL AMNESIA: ICD-10-CM

## 2023-02-03 LAB
MICROORGANISM SPEC CULT: NORMAL
SPECIMEN DESCRIPTION: NORMAL
TSH SERPL DL<=0.05 MIU/L-ACNC: 1.92 UIU/ML (ref 0.3–5)
VITAMIN B-12: 520 PG/ML (ref 232–1245)

## 2023-02-09 ENCOUNTER — PATIENT MESSAGE (OUTPATIENT)
Dept: PRIMARY CARE CLINIC | Age: 58
End: 2023-02-09

## 2023-02-09 NOTE — TELEPHONE ENCOUNTER
Ruben Talley MA 2/9/2023 4:33 PM EST      ----- Message -----  From: Marsha Gallardo  Sent: 2/9/2023 4:17 PM EST  To: Natalie Barrera Clinical Staff  Subject: Headaches     Dr. Piper Estrada, I had a headache all the time I was in the hospital. I have had more headaches since the amnesia incident than I have had in several months. I know everything was ruled out. Should I be concerned? Im taking a lot more Tylenol than I would like. Is this a concern? Thank you.      Eduardo Hou

## 2023-02-21 ENCOUNTER — HOSPITAL ENCOUNTER (OUTPATIENT)
Dept: SLEEP CENTER | Age: 58
Discharge: HOME | End: 2023-02-23
Payer: COMMERCIAL

## 2023-02-21 DIAGNOSIS — G45.4 TRANSIENT GLOBAL AMNESIA: ICD-10-CM

## 2023-02-21 DIAGNOSIS — G47.33 OSA (OBSTRUCTIVE SLEEP APNEA): ICD-10-CM

## 2023-02-21 PROCEDURE — 95811 POLYSOM 6/>YRS CPAP 4/> PARM: CPT

## 2023-02-22 VITALS
WEIGHT: 188 LBS | HEIGHT: 63 IN | OXYGEN SATURATION: 96 % | RESPIRATION RATE: 16 BRPM | HEART RATE: 76 BPM | BODY MASS INDEX: 33.31 KG/M2

## 2023-02-22 ASSESSMENT — SLEEP AND FATIGUE QUESTIONNAIRES
HOW LIKELY ARE YOU TO NOD OFF OR FALL ASLEEP WHILE WATCHING TV: 2
HOW LIKELY ARE YOU TO NOD OFF OR FALL ASLEEP WHILE SITTING AND TALKING TO SOMEONE: 0
HOW LIKELY ARE YOU TO NOD OFF OR FALL ASLEEP WHILE SITTING QUIETLY AFTER LUNCH WITHOUT ALCOHOL: 0
HOW LIKELY ARE YOU TO NOD OFF OR FALL ASLEEP WHILE SITTING INACTIVE IN A PUBLIC PLACE: 0
HOW LIKELY ARE YOU TO NOD OFF OR FALL ASLEEP WHEN YOU ARE A PASSENGER IN A CAR FOR AN HOUR WITHOUT A BREAK: 1
HOW LIKELY ARE YOU TO NOD OFF OR FALL ASLEEP WHILE SITTING AND READING: 1
HOW LIKELY ARE YOU TO NOD OFF OR FALL ASLEEP WHILE LYING DOWN TO REST IN THE AFTERNOON WHEN CIRCUMSTANCES PERMIT: 2
HOW LIKELY ARE YOU TO NOD OFF OR FALL ASLEEP IN A CAR, WHILE STOPPED FOR A FEW MINUTES IN TRAFFIC: 0
ESS TOTAL SCORE: 6

## 2023-02-22 NOTE — PAYOR INFORMATION
Verified Demographics with Patient? No   If no why? Already verified  [facility] Completed? No    If not why? Already completed  Verified Insurance through: Already verified  COB Completed? Not required  Auth Verification #:[id number]  Liability Due: $0  Discount Offered: n/a%       Previous Balance: $ 0   Discount Offered: n/a%  Site Collect Status: no liability  Patient Response: no liability  Financial Aid Offered?  Yes Pt declined  Cards Scanned: yes

## 2023-03-04 ENCOUNTER — PATIENT MESSAGE (OUTPATIENT)
Dept: PRIMARY CARE CLINIC | Age: 58
End: 2023-03-04

## 2023-03-06 NOTE — TELEPHONE ENCOUNTER
From: Dali Malone  To: Dr. Becki Servin: 3/4/2023 1:30 PM EST  Subject: Cpap study    Dr Eda Villalpando wanted me to follow up after my sleep study. I am still waiting to hear from the medical equipment supplier. They expected a three week timeframe for equipment to be received. Do you want to see me before 4/4? Thank you.      Miguel Ángel Huang

## 2023-04-10 PROBLEM — Z79.899 MEDICATION MANAGEMENT: Status: ACTIVE | Noted: 2023-04-10

## 2023-04-10 PROBLEM — L84 CORN: Status: ACTIVE | Noted: 2023-04-10

## 2023-07-20 ENCOUNTER — TELEPHONE (OUTPATIENT)
Dept: SURGERY | Age: 58
End: 2023-07-20

## 2023-07-20 ENCOUNTER — OFFICE VISIT (OUTPATIENT)
Dept: PRIMARY CARE CLINIC | Age: 58
End: 2023-07-20
Payer: COMMERCIAL

## 2023-07-20 VITALS
HEIGHT: 63 IN | OXYGEN SATURATION: 94 % | WEIGHT: 213.6 LBS | DIASTOLIC BLOOD PRESSURE: 88 MMHG | SYSTOLIC BLOOD PRESSURE: 122 MMHG | BODY MASS INDEX: 37.85 KG/M2 | HEART RATE: 88 BPM

## 2023-07-20 DIAGNOSIS — F41.8 DEPRESSION WITH ANXIETY: ICD-10-CM

## 2023-07-20 DIAGNOSIS — Z12.31 ENCOUNTER FOR SCREENING MAMMOGRAM FOR BREAST CANCER: ICD-10-CM

## 2023-07-20 DIAGNOSIS — Z23 IMMUNIZATION DUE: ICD-10-CM

## 2023-07-20 DIAGNOSIS — F33.1 MODERATE EPISODE OF RECURRENT MAJOR DEPRESSIVE DISORDER (HCC): ICD-10-CM

## 2023-07-20 DIAGNOSIS — Z79.899 MEDICATION MANAGEMENT: Primary | ICD-10-CM

## 2023-07-20 DIAGNOSIS — Z12.11 COLON CANCER SCREENING: ICD-10-CM

## 2023-07-20 PROCEDURE — 90750 HZV VACC RECOMBINANT IM: CPT | Performed by: FAMILY MEDICINE

## 2023-07-20 PROCEDURE — 90471 IMMUNIZATION ADMIN: CPT | Performed by: FAMILY MEDICINE

## 2023-07-20 PROCEDURE — 1036F TOBACCO NON-USER: CPT | Performed by: FAMILY MEDICINE

## 2023-07-20 PROCEDURE — G8417 CALC BMI ABV UP PARAM F/U: HCPCS | Performed by: FAMILY MEDICINE

## 2023-07-20 PROCEDURE — G8427 DOCREV CUR MEDS BY ELIG CLIN: HCPCS | Performed by: FAMILY MEDICINE

## 2023-07-20 PROCEDURE — 3017F COLORECTAL CA SCREEN DOC REV: CPT | Performed by: FAMILY MEDICINE

## 2023-07-20 PROCEDURE — 99213 OFFICE O/P EST LOW 20 MIN: CPT | Performed by: FAMILY MEDICINE

## 2023-07-20 RX ORDER — ESCITALOPRAM OXALATE 20 MG/1
20 TABLET ORAL DAILY
Qty: 90 TABLET | Refills: 2 | Status: SHIPPED | OUTPATIENT
Start: 2023-07-20 | End: 2024-04-15

## 2023-07-20 NOTE — PROGRESS NOTES
[address] [facility]  [address] [facility] [address]  Dept: [phone]    Kizzy Ramey is a 60 y.o. female Established patient, who presents today for her medical conditions/complaintsas noted below. Chief Complaint   Patient presents with    Depression     Pt is here for a f/u. HPI:     HPI  Depression is better. Still work stress, sometimes working 12 hrs a day. Home stress is improved. Reviewed prior notes None  Reviewed previous Labs and Imaging    Discussed: snacking a lot and 1-2 decent meals. Eating more outside the house. Has occasional beers: 4 beers once week. Sometimes mixed drinks    She is getting individual counseling and couples counseling with her partner Serafin Machuca.      LDL Cholesterol (mg/dL)   Date Value   2022 104   2021 93       (goal LDL is <100)   AST (U/L)   Date Value   2023 21     ALT (U/L)   Date Value   2023 21     BUN (mg/dL)   Date Value   2023 9     TSH (uIU/mL)   Date Value   2023 1.92     BP Readings from Last 3 Encounters:   23 122/88   04/10/23 124/84   23 126/82          (goal 120/80)    Past Medical History:   Diagnosis Date    Anxiety     Asthma     Former     Depression     Minor     Sleep apnea     dont use a machine ( lost weight )      Past Surgical History:   Procedure Laterality Date    GALLBLADDER SURGERY      HYSTERECTOMY, VAGINAL      OVARIES ONLY        Family History   Problem Relation Age of Onset    Arrhythmia Mother     Cancer Mother     Depression Mother     High Cholesterol Mother     Diabetes Mother     Miscarriages / [place] Mother     Other Mother     Arrhythmia Father     Heart Disease Father     High Cholesterol Father     Other Maternal Grandmother        Social History     Tobacco Use    Smoking status: Former     Packs/day: 1.50     Years: 3.00     Pack years: 4.50     Types: Cigarettes     Quit date: 1998     Years since quittin.6

## 2023-07-21 RX ORDER — POLYETHYLENE GLYCOL 3350, SODIUM SULFATE ANHYDROUS, SODIUM BICARBONATE, SODIUM CHLORIDE, POTASSIUM CHLORIDE 236; 22.74; 6.74; 5.86; 2.97 G/4L; G/4L; G/4L; G/4L; G/4L
POWDER, FOR SOLUTION ORAL
Qty: 1 EACH | Refills: 0 | OUTPATIENT
Start: 2023-07-21

## 2023-07-21 NOTE — TELEPHONE ENCOUNTER
[facility] Surgery  Screening colonoscopy questionnaire  Keira Acevedo    Pt Name: Ramo Vargas  MRN: [id number]  YOB: 1965  Primary Care Physician: Paramjit Conway MD      Have you ever had a colonoscopy? Yes  When was your last colonoscopy? 10 years  Were any polyps removed or any other significant findings? Have you recently had a stool test to check for colon cancer? No  Was it positive? Do you have any family history of colon cancer? No  If yes, which family member had colon cancer? Were they diagnosed younger or older than the age of 61? Do you have a history of Crohn's disease or Ulcerative Colitis? No    Do you have a history of constipation? No    Do you have a history of bloody or black stools? No    Have you ever had surgery done inside your abdomen? Yes  What surgery? Hysterectomy, Gallbladder    8. Are you taking any blood thinners? No   If yes, what is the name of the blood thinner? Scheduling:    Velma Sloan - Spoke to patient, colonoscopy scheduled at [facility], patient confirmed and information mailed to patient. Surgery date/time: 11-10-23 at 8:30AM  Arrival time: 7:00AM  Prep appt: Mailed and reviewed with Pt. Sent message to Dr. Barb Johnson to sign Bowel Prep Meds.

## 2023-09-26 ENCOUNTER — HOSPITAL ENCOUNTER (OUTPATIENT)
Dept: MAMMOGRAPHY | Age: 58
Discharge: HOME | End: 2023-09-28
Payer: COMMERCIAL

## 2023-09-26 VITALS — BODY MASS INDEX: 37.74 KG/M2 | HEIGHT: 63 IN | WEIGHT: 213 LBS

## 2023-09-26 DIAGNOSIS — Z12.31 ENCOUNTER FOR SCREENING MAMMOGRAM FOR BREAST CANCER: ICD-10-CM

## 2023-09-26 PROCEDURE — 77063 BREAST TOMOSYNTHESIS BI: CPT

## 2023-11-11 NOTE — DISCHARGE INSTRUCTIONS

## 2023-11-16 ENCOUNTER — ANESTHESIA EVENT (OUTPATIENT)
Dept: OPERATING ROOM | Age: 58
End: 2023-11-16
Payer: COMMERCIAL

## 2023-11-17 ENCOUNTER — ANESTHESIA (OUTPATIENT)
Dept: OPERATING ROOM | Age: 58
End: 2023-11-17
Payer: COMMERCIAL

## 2023-11-17 ENCOUNTER — HOSPITAL ENCOUNTER (OUTPATIENT)
Age: 58
Setting detail: OUTPATIENT SURGERY
Discharge: HOME | End: 2023-11-17
Attending: SURGERY | Admitting: SURGERY
Payer: COMMERCIAL

## 2023-11-17 VITALS
HEIGHT: 63 IN | WEIGHT: 216 LBS | TEMPERATURE: 97.3 F | RESPIRATION RATE: 10 BRPM | HEART RATE: 68 BPM | BODY MASS INDEX: 38.27 KG/M2 | DIASTOLIC BLOOD PRESSURE: 69 MMHG | OXYGEN SATURATION: 98 % | SYSTOLIC BLOOD PRESSURE: 143 MMHG

## 2023-11-17 PROBLEM — Z12.11 COLON CANCER SCREENING: Status: ACTIVE | Noted: 2023-11-17

## 2023-11-17 PROCEDURE — 45378 DIAGNOSTIC COLONOSCOPY: CPT | Performed by: SURGERY

## 2023-11-17 PROCEDURE — 7100000010 HC PHASE II RECOVERY - FIRST 15 MIN: Performed by: SURGERY

## 2023-11-17 PROCEDURE — 2500000003 HC RX 250 WO HCPCS: Performed by: SPECIALIST

## 2023-11-17 PROCEDURE — 7100000011 HC PHASE II RECOVERY - ADDTL 15 MIN: Performed by: SURGERY

## 2023-11-17 PROCEDURE — 2580000003 HC RX 258: Performed by: ANESTHESIOLOGY

## 2023-11-17 PROCEDURE — 3700000000 HC ANESTHESIA ATTENDED CARE: Performed by: SURGERY

## 2023-11-17 PROCEDURE — 6360000002 HC RX W HCPCS: Performed by: SPECIALIST

## 2023-11-17 PROCEDURE — 3700000001 HC ADD 15 MINUTES (ANESTHESIA): Performed by: SURGERY

## 2023-11-17 PROCEDURE — 2709999900 HC NON-CHARGEABLE SUPPLY: Performed by: SURGERY

## 2023-11-17 PROCEDURE — 3609027000 HC COLONOSCOPY: Performed by: SURGERY

## 2023-11-17 RX ORDER — SODIUM CHLORIDE 0.9 % (FLUSH) 0.9 %
5-40 SYRINGE (ML) INJECTION PRN
Status: DISCONTINUED | OUTPATIENT
Start: 2023-11-17 | End: 2023-11-17 | Stop reason: HOSPADM

## 2023-11-17 RX ORDER — SODIUM CHLORIDE 9 MG/ML
INJECTION, SOLUTION INTRAVENOUS PRN
Status: DISCONTINUED | OUTPATIENT
Start: 2023-11-17 | End: 2023-11-17 | Stop reason: HOSPADM

## 2023-11-17 RX ORDER — PROPOFOL 10 MG/ML
INJECTION, EMULSION INTRAVENOUS PRN
Status: DISCONTINUED | OUTPATIENT
Start: 2023-11-17 | End: 2023-11-17 | Stop reason: SDUPTHER

## 2023-11-17 RX ORDER — SODIUM CHLORIDE 0.9 % (FLUSH) 0.9 %
5-40 SYRINGE (ML) INJECTION EVERY 12 HOURS SCHEDULED
Status: DISCONTINUED | OUTPATIENT
Start: 2023-11-17 | End: 2023-11-17 | Stop reason: HOSPADM

## 2023-11-17 RX ORDER — METOCLOPRAMIDE HYDROCHLORIDE 5 MG/ML
10 INJECTION INTRAMUSCULAR; INTRAVENOUS
Status: DISCONTINUED | OUTPATIENT
Start: 2023-11-17 | End: 2023-11-17 | Stop reason: HOSPADM

## 2023-11-17 RX ORDER — ONDANSETRON 2 MG/ML
4 INJECTION INTRAMUSCULAR; INTRAVENOUS
Status: DISCONTINUED | OUTPATIENT
Start: 2023-11-17 | End: 2023-11-17 | Stop reason: HOSPADM

## 2023-11-17 RX ORDER — LABETALOL HYDROCHLORIDE 5 MG/ML
10 INJECTION, SOLUTION INTRAVENOUS
Status: DISCONTINUED | OUTPATIENT
Start: 2023-11-17 | End: 2023-11-17 | Stop reason: HOSPADM

## 2023-11-17 RX ORDER — DIPHENHYDRAMINE HYDROCHLORIDE 50 MG/ML
12.5 INJECTION INTRAMUSCULAR; INTRAVENOUS
Status: DISCONTINUED | OUTPATIENT
Start: 2023-11-17 | End: 2023-11-17 | Stop reason: HOSPADM

## 2023-11-17 RX ORDER — LIDOCAINE HYDROCHLORIDE 10 MG/ML
INJECTION, SOLUTION INFILTRATION; PERINEURAL PRN
Status: DISCONTINUED | OUTPATIENT
Start: 2023-11-17 | End: 2023-11-17 | Stop reason: SDUPTHER

## 2023-11-17 RX ORDER — MORPHINE SULFATE 2 MG/ML
2 INJECTION, SOLUTION INTRAMUSCULAR; INTRAVENOUS EVERY 5 MIN PRN
Status: DISCONTINUED | OUTPATIENT
Start: 2023-11-17 | End: 2023-11-17 | Stop reason: HOSPADM

## 2023-11-17 RX ORDER — MEPERIDINE HYDROCHLORIDE 50 MG/ML
12.5 INJECTION INTRAMUSCULAR; INTRAVENOUS; SUBCUTANEOUS EVERY 5 MIN PRN
Status: DISCONTINUED | OUTPATIENT
Start: 2023-11-17 | End: 2023-11-17 | Stop reason: HOSPADM

## 2023-11-17 RX ORDER — SODIUM CHLORIDE, SODIUM LACTATE, POTASSIUM CHLORIDE, CALCIUM CHLORIDE 600; 310; 30; 20 MG/100ML; MG/100ML; MG/100ML; MG/100ML
INJECTION, SOLUTION INTRAVENOUS CONTINUOUS
Status: DISCONTINUED | OUTPATIENT
Start: 2023-11-17 | End: 2023-11-17 | Stop reason: HOSPADM

## 2023-11-17 RX ORDER — MIDAZOLAM HYDROCHLORIDE 2 MG/2ML
2 INJECTION, SOLUTION INTRAMUSCULAR; INTRAVENOUS
Status: DISCONTINUED | OUTPATIENT
Start: 2023-11-17 | End: 2023-11-17 | Stop reason: HOSPADM

## 2023-11-17 RX ADMIN — PROPOFOL 50 MG: 10 INJECTION, EMULSION INTRAVENOUS at 07:32

## 2023-11-17 RX ADMIN — PROPOFOL 50 MG: 10 INJECTION, EMULSION INTRAVENOUS at 07:25

## 2023-11-17 RX ADMIN — PROPOFOL 50 MG: 10 INJECTION, EMULSION INTRAVENOUS at 07:29

## 2023-11-17 RX ADMIN — LIDOCAINE HYDROCHLORIDE 40 MG: 10 INJECTION, SOLUTION INFILTRATION; PERINEURAL at 07:19

## 2023-11-17 RX ADMIN — PROPOFOL 100 MG: 10 INJECTION, EMULSION INTRAVENOUS at 07:19

## 2023-11-17 RX ADMIN — PROPOFOL 50 MG: 10 INJECTION, EMULSION INTRAVENOUS at 07:39

## 2023-11-17 RX ADMIN — SODIUM CHLORIDE, POTASSIUM CHLORIDE, SODIUM LACTATE AND CALCIUM CHLORIDE: 600; 310; 30; 20 INJECTION, SOLUTION INTRAVENOUS at 06:49

## 2023-11-17 ASSESSMENT — PAIN - FUNCTIONAL ASSESSMENT: PAIN_FUNCTIONAL_ASSESSMENT: 0-10

## 2023-11-17 NOTE — H&P
[address] [facility]      Patient's Name/ Date of Birth/ Gender: Julisa Jonas / 1965 (60 y.o.) / female     Attending physician: Deep Rascon DO    CC: colorectal cancer screening    History of present Illness: Julisa Jonas is a 60 y.o. female, presents today for colonoscopy for:    Active Hospital Problems    Diagnosis Date Noted    Colon cancer screening [Z12.11] 11/17/2023         Colonoscopy history: Last cscope was 9yrs ago. Past Medical History:  has a past medical history of Anxiety, Asthma, Depression, and Sleep apnea. Past Surgical History:   Past Surgical History:   Procedure Laterality Date    GALLBLADDER SURGERY      HYSTERECTOMY, VAGINAL      OVARIES ONLY        Social History:  reports that she quit smoking about 24 years ago. Her smoking use included cigarettes. She has a 4.50 pack-year smoking history. She has never used smokeless tobacco. She reports current alcohol use. She reports that she does not use drugs. Family History: family history includes Arrhythmia in her father and mother; Cancer in her mother; Depression in her mother; Diabetes in her mother; Heart Disease in her father; High Cholesterol in her father and mother; Gar Gene / [place] in her mother; Other in her maternal grandmother and mother. Review of Systems:   General: Denies fever, chills, night sweats, weight loss, malaise, fatigue  HEENT: Denies sore throat, sinus problems, allergic rhinosinusitis  Card: Denies chest pain, palpitations, orthopnea/PND. Denies h/o murmurs  Pulm: Denies cough, shortness of breath, MARTINEZ  GI:  per HPI; denies history of constipation, diarrhea, hematochezia or melena  : Denies polyuria, dysuria, hematuria  Endo: Denies diabetes, thyroid problems. Heme: Denies anemia, h/o bleeding or clotting problems. Neuro: Denies h/o CVA, TIA  Skin: Denies rashes, ulcers  Musculoskeletal: Denies muscle, joint, back pain.     Allergies: Latex, Pcn

## 2023-11-17 NOTE — OP NOTE
Operative Note      Patient: Ramo Vargas  YOB: 1965  MRN: [id number]    Date of Procedure: 11/17/2023    Pre-Op Diagnosis Codes:     * Screen for colon cancer [Z12.11]    Post-Op Diagnosis:   Sigmoid diverticulosis  Internal hemorrhoids       Procedure(s):  COLORECTAL CANCER SCREENING, NOT HIGH RISK    Surgeon(s):  Shade Delaney DO    Assistant:   * No surgical staff found *    Anesthesia: Monitor Anesthesia Care    Estimated Blood Loss (mL):   none    Complications: None    Specimens:   * No specimens in log *    Implants:  * No implants in log *      Drains: * No LDAs found *    Findings: as above        Detailed Description of Procedure:       INDICATIONS FOR PROCEDURE:   The patient is a 60y.o. year old female with history of above preop diagnosis. Colonoscopy with possible biopsy or polypectomy was recommend, the risk, benefits, expected outcome, and alternatives to the procedure were explained. Risks included but are not limited to bleeding, infection, respiratory distress, hypotension, and perforation of the colon. The patient understands and is in agreement. PROCEDURE DETAILS:  Patient was brought to the endoscopy suite and placed in the left lateral recumbent position. A time out was completed verifying correct patient, procedure and equipment. Anesthesia was induced using MAC guidelines. A digital rectal exam was performed. The colonoscope was inserted into the rectum without difficulty and the scope was advanced to the cecum which was identified by anatomy and by palpation. Bowel prep was adequate. The scope was slowly withdrawn visualizing the cecum, ascending colon, transverse colon, descending colon, sigmoid colon and rectum. The scope was withdrawn to the rectal vault and then retroflexed. The scope was then straightened and removed. The patient tolerated the procedure well and was transferred to the recovery unit in stable condition.     Findings:    Polyps:   none      Other

## 2023-11-17 NOTE — ANESTHESIA POSTPROCEDURE EVALUATION
Department of Anesthesiology  Postprocedure Note    Patient: Corona Goldman  MRN: [id number]  YOB: 1965  Date of evaluation: 11/17/2023      Procedure Summary     Date: 11/17/23 Room / Location: [address] / [facility]    Anesthesia Start: 2792 Anesthesia Stop: 7048    Procedure: COLORECTAL CANCER SCREENING, NOT HIGH RISK Diagnosis:       Screen for colon cancer      (Screen for colon cancer [Z12.11])    Surgeons: Kiel Pham DO Responsible Provider: Rach Gomez MD    Anesthesia Type: general, TIVA ASA Status: 2          Anesthesia Type: No value filed.     Bakari Phase I: Bakari Score: 10    Bakari Phase II: Bakari Score: 10      Anesthesia Post Evaluation    Patient location during evaluation: PACU  Patient participation: complete - patient participated  Level of consciousness: awake  Airway patency: patent  Nausea & Vomiting: no nausea and no vomiting  Complications: no  Cardiovascular status: blood pressure returned to baseline  Respiratory status: acceptable  Hydration status: euvolemic  Pain management: adequate

## 2023-11-27 ENCOUNTER — TELEPHONE (OUTPATIENT)
Dept: PRIMARY CARE CLINIC | Age: 58
End: 2023-11-27

## 2023-11-27 DIAGNOSIS — U07.1 COVID: Primary | ICD-10-CM

## 2023-11-27 NOTE — TELEPHONE ENCOUNTER
Pt positive for covid with home test, sx started yesterday. Wife is also covid positive.  Asking for jean claude to go to [facility]

## 2023-12-17 PROBLEM — Z12.11 COLON CANCER SCREENING: Status: RESOLVED | Noted: 2023-11-17 | Resolved: 2023-12-17

## 2024-01-05 ENCOUNTER — PATIENT MESSAGE (OUTPATIENT)
Dept: PRIMARY CARE CLINIC | Age: 59
End: 2024-01-05

## 2024-01-05 NOTE — TELEPHONE ENCOUNTER
Arsenio Sandoval MA 1/5/2024 4:10 PM EST    Please advise - should we send pt to UC or put orders in for lab?  ----- Message -----  From: Henrietta Shea \"Elva\"  Sent: 1/5/2024 3:10 PM EST  To: Natalie Barrera Clinical Staff  Subject: Possible UTI     Any chance of an early morning appt to check for a uti? I have frequent urination feeling but drips come out. No blood or pain. Thank you

## 2024-01-08 DIAGNOSIS — F33.1 MODERATE EPISODE OF RECURRENT MAJOR DEPRESSIVE DISORDER (HCC): ICD-10-CM

## 2024-01-08 RX ORDER — ESCITALOPRAM OXALATE 20 MG/1
20 TABLET ORAL DAILY
Qty: 90 TABLET | Refills: 3 | Status: SHIPPED | OUTPATIENT
Start: 2024-01-08

## 2024-01-08 NOTE — TELEPHONE ENCOUNTER
LAST VISIT:   7/20/2023     Future Appointments   Date Time Provider Department Center   1/19/2024  8:20 AM [facility]

## 2024-01-19 ENCOUNTER — OFFICE VISIT (OUTPATIENT)
Dept: PRIMARY CARE CLINIC | Age: 59
End: 2024-01-19
Payer: COMMERCIAL

## 2024-01-19 VITALS
HEART RATE: 78 BPM | OXYGEN SATURATION: 95 % | DIASTOLIC BLOOD PRESSURE: 76 MMHG | WEIGHT: 214.4 LBS | HEIGHT: 63 IN | BODY MASS INDEX: 37.99 KG/M2 | SYSTOLIC BLOOD PRESSURE: 110 MMHG

## 2024-01-19 DIAGNOSIS — F33.1 MODERATE EPISODE OF RECURRENT MAJOR DEPRESSIVE DISORDER (HCC): ICD-10-CM

## 2024-01-19 DIAGNOSIS — E66.01 SEVERE OBESITY (BMI 35.0-39.9) WITH COMORBIDITY (HCC): ICD-10-CM

## 2024-01-19 DIAGNOSIS — F41.8 DEPRESSION WITH ANXIETY: ICD-10-CM

## 2024-01-19 DIAGNOSIS — Z79.899 MEDICATION MANAGEMENT: Primary | ICD-10-CM

## 2024-01-19 PROCEDURE — 99213 OFFICE O/P EST LOW 20 MIN: CPT | Performed by: FAMILY MEDICINE

## 2024-01-19 ASSESSMENT — PATIENT HEALTH QUESTIONNAIRE - PHQ9
SUM OF ALL RESPONSES TO PHQ QUESTIONS 1-9: 0
4. FEELING TIRED OR HAVING LITTLE ENERGY: 0
SUM OF ALL RESPONSES TO PHQ QUESTIONS 1-9: 0
3. TROUBLE FALLING OR STAYING ASLEEP: 0
9. THOUGHTS THAT YOU WOULD BE BETTER OFF DEAD, OR OF HURTING YOURSELF: 0
2. FEELING DOWN, DEPRESSED OR HOPELESS: 0
5. POOR APPETITE OR OVEREATING: 0
SUM OF ALL RESPONSES TO PHQ QUESTIONS 1-9: 0
1. LITTLE INTEREST OR PLEASURE IN DOING THINGS: 0
10. IF YOU CHECKED OFF ANY PROBLEMS, HOW DIFFICULT HAVE THESE PROBLEMS MADE IT FOR YOU TO DO YOUR WORK, TAKE CARE OF THINGS AT HOME, OR GET ALONG WITH OTHER PEOPLE: 0
SUM OF ALL RESPONSES TO PHQ QUESTIONS 1-9: 0
7. TROUBLE CONCENTRATING ON THINGS, SUCH AS READING THE NEWSPAPER OR WATCHING TELEVISION: 0
6. FEELING BAD ABOUT YOURSELF - OR THAT YOU ARE A FAILURE OR HAVE LET YOURSELF OR YOUR FAMILY DOWN: 0
SUM OF ALL RESPONSES TO PHQ9 QUESTIONS 1 & 2: 0
8. MOVING OR SPEAKING SO SLOWLY THAT OTHER PEOPLE COULD HAVE NOTICED. OR THE OPPOSITE, BEING SO FIGETY OR RESTLESS THAT YOU HAVE BEEN MOVING AROUND A LOT MORE THAN USUAL: 0

## 2024-01-19 NOTE — PROGRESS NOTES
ill-appearing.   HENT:      Head: Normocephalic and atraumatic.      Right Ear: External ear normal.      Left Ear: External ear normal.   Eyes:      General: No scleral icterus.        Right eye: No discharge.         Left eye: No discharge.      Conjunctiva/sclera: Conjunctivae normal.   Neck:      Thyroid: No thyromegaly.      Trachea: No tracheal deviation.   Cardiovascular:      Rate and Rhythm: Normal rate and regular rhythm.      Heart sounds: Normal heart sounds.   Pulmonary:      Effort: Pulmonary effort is normal. No respiratory distress.      Breath sounds: Normal breath sounds. No wheezing.   Lymphadenopathy:      Cervical: No cervical adenopathy.   Skin:     General: Skin is warm.      Findings: No rash.   Neurological:      Mental Status: She is alert and oriented to person, place, and time.   Psychiatric:         Mood and Affect: Mood normal.         Behavior: Behavior normal.         Thought Content: Thought content normal.         Assessment:       Diagnosis Orders   1. Medication management        2. Moderate episode of recurrent major depressive disorder (HCC)        3. Depression with anxiety        4. Severe obesity (BMI 35.0-39.9) with comorbidity (HCC)             Plan:      No follow-ups on file.  Discussed vaccines  No orders of the defined types were placed in this encounter.    No orders of the defined types were placed in this encounter.      Patient given educationalmaterials - see patient instructions.  Discussed use, benefit, and side effectsof prescribed medications.  All patient questions answered. Pt voiced understanding.Reviewed health maintenance.  Instructed to continue current medications, diet andexercise.  Patient agreed with treatment plan. Follow up as directed.     Electronicallysigned by Lisa Cadena MD on 1/19/2024 at 8:52 AM

## 2024-01-19 NOTE — PATIENT INSTRUCTIONS

## 2024-07-24 ENCOUNTER — OFFICE VISIT (OUTPATIENT)
Dept: PRIMARY CARE CLINIC | Age: 59
End: 2024-07-24
Payer: COMMERCIAL

## 2024-07-24 VITALS
OXYGEN SATURATION: 93 % | BODY MASS INDEX: 37.46 KG/M2 | HEIGHT: 63 IN | WEIGHT: 211.4 LBS | SYSTOLIC BLOOD PRESSURE: 120 MMHG | HEART RATE: 81 BPM | DIASTOLIC BLOOD PRESSURE: 80 MMHG

## 2024-07-24 DIAGNOSIS — Z12.31 BREAST CANCER SCREENING BY MAMMOGRAM: ICD-10-CM

## 2024-07-24 DIAGNOSIS — F41.8 DEPRESSION WITH ANXIETY: ICD-10-CM

## 2024-07-24 DIAGNOSIS — G47.33 OSA (OBSTRUCTIVE SLEEP APNEA): ICD-10-CM

## 2024-07-24 DIAGNOSIS — Z79.899 MEDICATION MANAGEMENT: Primary | ICD-10-CM

## 2024-07-24 DIAGNOSIS — M25.542 ARTHRALGIA OF BOTH HANDS: ICD-10-CM

## 2024-07-24 DIAGNOSIS — M25.541 ARTHRALGIA OF BOTH HANDS: ICD-10-CM

## 2024-07-24 PROCEDURE — 99214 OFFICE O/P EST MOD 30 MIN: CPT | Performed by: FAMILY MEDICINE

## 2024-07-24 ASSESSMENT — ENCOUNTER SYMPTOMS: RESPIRATORY NEGATIVE: 1

## 2024-07-24 NOTE — PROGRESS NOTES
Cholesterol Father     Other Maternal Grandmother        Social History     Tobacco Use    Smoking status: Former     Current packs/day: 0.00     Average packs/day: 1.5 packs/day for 3.0 years (4.5 ttl pk-yrs)     Types: Cigarettes     Start date: 1995     Quit date: 1998     Years since quittin.6    Smokeless tobacco: Never   Substance Use Topics    Alcohol use: Yes     Comment: Once in awhile       Current Outpatient Medications   Medication Sig Dispense Refill    escitalopram (LEXAPRO) 20 MG tablet TAKE 1 TABLET DAILY 90 tablet 3    aspirin 81 MG EC tablet Take 1 tablet by mouth daily 90 tablet 3    Cholecalciferol (VITAMIN D3 PO) Take by mouth      methylcellulose (CITRUCEL) 500 MG TABS Take by mouth      Multiple Vitamins-Minerals (THERAPEUTIC MULTIVITAMIN-MINERALS) tablet Take 1 tablet by mouth daily       No current facility-administered medications for this visit.     Allergies   Allergen Reactions    Latex     Pcn [Penicillins] Diarrhea    Bactrim [Sulfamethoxazole-Trimethoprim] Hives and Rash       Health Maintenance   Topic Date Due    HIV screen  Never done    Hepatitis C screen  Never done    COVID-19 Vaccine (2023- season) 2023    Respiratory Syncytial Virus (RSV) Pregnant or age 60 yrs+ (1 - 1-dose 60+ series) Never done    Flu vaccine (1) 2024    Depression Monitoring  2025    Breast cancer screen  2025    DTaP/Tdap/Td vaccine (2 - Td or Tdap) 2027    Lipids  2027    Colorectal Cancer Screen  2033    Hepatitis B vaccine  Completed    Shingles vaccine  Completed    Hepatitis A vaccine  Aged Out    Hib vaccine  Aged Out    Polio vaccine  Aged Out    Meningococcal (ACWY) vaccine  Aged Out    Pneumococcal 0-64 years Vaccine  Aged Out    Diabetes screen  Discontinued       Subjective:      Review of Systems   Constitutional: Negative.    Respiratory: Negative.     Musculoskeletal:  Positive for arthralgias.       Objective:     /80   Pulse

## 2024-09-12 ENCOUNTER — OFFICE VISIT (OUTPATIENT)
Dept: PRIMARY CARE CLINIC | Age: 59
End: 2024-09-12
Payer: COMMERCIAL

## 2024-09-12 VITALS
WEIGHT: 216.2 LBS | HEART RATE: 79 BPM | DIASTOLIC BLOOD PRESSURE: 86 MMHG | OXYGEN SATURATION: 95 % | SYSTOLIC BLOOD PRESSURE: 130 MMHG | HEIGHT: 63 IN | BODY MASS INDEX: 38.31 KG/M2

## 2024-09-12 DIAGNOSIS — Z00.00 WELLNESS EXAMINATION: ICD-10-CM

## 2024-09-12 DIAGNOSIS — Z00.00 ENCOUNTER FOR WELL ADULT EXAM WITHOUT ABNORMAL FINDINGS: Primary | ICD-10-CM

## 2024-09-12 PROCEDURE — 99396 PREV VISIT EST AGE 40-64: CPT | Performed by: FAMILY MEDICINE

## 2024-09-12 ASSESSMENT — ENCOUNTER SYMPTOMS: RESPIRATORY NEGATIVE: 1

## 2024-09-14 ENCOUNTER — HOSPITAL ENCOUNTER (OUTPATIENT)
Age: 59
Discharge: HOME | End: 2024-09-14
Payer: COMMERCIAL

## 2024-09-14 DIAGNOSIS — Z00.00 WELLNESS EXAMINATION: ICD-10-CM

## 2024-09-14 DIAGNOSIS — Z00.00 ENCOUNTER FOR WELL ADULT EXAM WITHOUT ABNORMAL FINDINGS: ICD-10-CM

## 2024-09-14 LAB
ALBUMIN SERPL-MCNC: 4.3 G/DL (ref 3.5–5.2)
ALBUMIN/GLOB SERPL: 2 {RATIO} (ref 1–2.5)
ALP SERPL-CCNC: 80 U/L (ref 35–104)
ALT SERPL-CCNC: 18 U/L (ref 10–35)
ANION GAP SERPL CALCULATED.3IONS-SCNC: 9 MMOL/L (ref 9–16)
AST SERPL-CCNC: 20 U/L (ref 10–35)
BASOPHILS # BLD: 0.05 K/UL (ref 0–0.2)
BASOPHILS NFR BLD: 1 % (ref 0–2)
BILIRUB SERPL-MCNC: 0.7 MG/DL (ref 0–1.2)
BUN SERPL-MCNC: 14 MG/DL (ref 8–23)
CALCIUM SERPL-MCNC: 8.9 MG/DL (ref 8.6–10.4)
CHLORIDE SERPL-SCNC: 106 MMOL/L (ref 98–107)
CHOLEST SERPL-MCNC: 180 MG/DL (ref 0–199)
CHOLESTEROL/HDL RATIO: 3
CO2 SERPL-SCNC: 27 MMOL/L (ref 20–31)
CREAT SERPL-MCNC: 0.7 MG/DL (ref 0.5–0.9)
EOSINOPHIL # BLD: 0.16 K/UL (ref 0–0.44)
EOSINOPHILS RELATIVE PERCENT: 3 % (ref 1–4)
ERYTHROCYTE [DISTWIDTH] IN BLOOD BY AUTOMATED COUNT: 12.4 % (ref 11.8–14.4)
GFR, ESTIMATED: >90 ML/MIN/1.73M2
GGT SERPL-CCNC: 11 U/L (ref 5–36)
GLUCOSE P FAST SERPL-MCNC: 95 MG/DL (ref 74–99)
HCT VFR BLD AUTO: 41.1 % (ref 36.3–47.1)
HDLC SERPL-MCNC: 54 MG/DL
HGB BLD-MCNC: 13.4 G/DL (ref 11.9–15.1)
IMM GRANULOCYTES # BLD AUTO: <0.03 K/UL (ref 0–0.3)
IMM GRANULOCYTES NFR BLD: 0 %
LDH SERPL-CCNC: 156 U/L (ref 135–214)
LDLC SERPL CALC-MCNC: 106 MG/DL (ref 0–100)
LYMPHOCYTES NFR BLD: 1.12 K/UL (ref 1.1–3.7)
LYMPHOCYTES RELATIVE PERCENT: 22 % (ref 24–43)
MCH RBC QN AUTO: 30.9 PG (ref 25.2–33.5)
MCHC RBC AUTO-ENTMCNC: 32.6 G/DL (ref 28.4–34.8)
MCV RBC AUTO: 94.9 FL (ref 82.6–102.9)
MONOCYTES NFR BLD: 0.66 K/UL (ref 0.1–1.2)
MONOCYTES NFR BLD: 13 % (ref 3–12)
NEUTROPHILS NFR BLD: 61 % (ref 36–65)
NEUTS SEG NFR BLD: 3.09 K/UL (ref 1.5–8.1)
NRBC BLD-RTO: 0 PER 100 WBC
PLATELET # BLD AUTO: 203 K/UL (ref 138–453)
PMV BLD AUTO: 9.5 FL (ref 8.1–13.5)
POTASSIUM SERPL-SCNC: 4.4 MMOL/L (ref 3.7–5.3)
PROT SERPL-MCNC: 6.8 G/DL (ref 6.6–8.7)
RBC # BLD AUTO: 4.33 M/UL (ref 3.95–5.11)
SODIUM SERPL-SCNC: 142 MMOL/L (ref 136–145)
TRIGL SERPL-MCNC: 102 MG/DL
VLDLC SERPL CALC-MCNC: 20 MG/DL
WBC OTHER # BLD: 5.1 K/UL (ref 3.5–11.3)

## 2024-09-14 PROCEDURE — 80061 LIPID PANEL: CPT

## 2024-09-14 PROCEDURE — 82977 ASSAY OF GGT: CPT

## 2024-09-14 PROCEDURE — 85025 COMPLETE CBC W/AUTO DIFF WBC: CPT

## 2024-09-14 PROCEDURE — 83615 LACTATE (LD) (LDH) ENZYME: CPT

## 2024-09-14 PROCEDURE — 80053 COMPREHEN METABOLIC PANEL: CPT

## 2024-09-14 PROCEDURE — 36415 COLL VENOUS BLD VENIPUNCTURE: CPT

## 2024-09-19 ENCOUNTER — TELEPHONE (OUTPATIENT)
Dept: PRIMARY CARE CLINIC | Age: 59
End: 2024-09-19

## 2024-10-10 ENCOUNTER — PATIENT MESSAGE (OUTPATIENT)
Dept: PRIMARY CARE CLINIC | Age: 59
End: 2024-10-10

## 2024-11-22 DIAGNOSIS — F33.1 MODERATE EPISODE OF RECURRENT MAJOR DEPRESSIVE DISORDER (HCC): ICD-10-CM

## 2024-11-22 RX ORDER — ASPIRIN 81 MG/1
81 TABLET ORAL DAILY
Qty: 90 TABLET | Refills: 3 | Status: SHIPPED | OUTPATIENT
Start: 2024-11-22 | End: 2025-11-17

## 2024-11-22 RX ORDER — ESCITALOPRAM OXALATE 20 MG/1
20 TABLET ORAL DAILY
Qty: 90 TABLET | Refills: 3 | Status: SHIPPED | OUTPATIENT
Start: 2024-11-22

## 2024-11-26 ENCOUNTER — PATIENT MESSAGE (OUTPATIENT)
Dept: PRIMARY CARE CLINIC | Age: 59
End: 2024-11-26

## 2024-12-30 ENCOUNTER — PATIENT MESSAGE (OUTPATIENT)
Dept: PRIMARY CARE CLINIC | Age: 59
End: 2024-12-30

## 2025-02-07 RX ORDER — ASPIRIN 81 MG/1
81 TABLET ORAL DAILY
Qty: 90 TABLET | Refills: 3 | Status: SHIPPED | OUTPATIENT
Start: 2025-02-07 | End: 2026-02-02

## 2025-04-21 DIAGNOSIS — F33.1 MODERATE EPISODE OF RECURRENT MAJOR DEPRESSIVE DISORDER (HCC): ICD-10-CM

## 2025-04-21 RX ORDER — ESCITALOPRAM OXALATE 20 MG/1
20 TABLET ORAL DAILY
Qty: 90 TABLET | Refills: 3 | Status: SHIPPED | OUTPATIENT
Start: 2025-04-21

## 2025-05-09 DIAGNOSIS — F33.1 MODERATE EPISODE OF RECURRENT MAJOR DEPRESSIVE DISORDER (HCC): ICD-10-CM

## 2025-05-09 RX ORDER — ESCITALOPRAM OXALATE 20 MG/1
20 TABLET ORAL DAILY
Qty: 90 TABLET | Refills: 3 | Status: SHIPPED | OUTPATIENT
Start: 2025-05-09

## 2025-05-13 DIAGNOSIS — F33.1 MODERATE EPISODE OF RECURRENT MAJOR DEPRESSIVE DISORDER (HCC): ICD-10-CM

## 2025-05-13 RX ORDER — ESCITALOPRAM OXALATE 20 MG/1
20 TABLET ORAL DAILY
Qty: 90 TABLET | Refills: 0 | Status: SHIPPED | OUTPATIENT
Start: 2025-05-13

## 2025-06-05 PROBLEM — Z79.899 MEDICATION MANAGEMENT: Status: RESOLVED | Noted: 2023-04-10 | Resolved: 2025-06-05

## 2025-06-05 PROBLEM — L84 CORN: Status: RESOLVED | Noted: 2023-04-10 | Resolved: 2025-06-05

## 2025-06-05 PROBLEM — F33.1 MODERATE EPISODE OF RECURRENT MAJOR DEPRESSIVE DISORDER (HCC): Status: RESOLVED | Noted: 2024-01-19 | Resolved: 2025-06-05

## 2025-06-05 NOTE — PROGRESS NOTES
[facility]  [facility]  [address]  [place] [address]  Dept: [phone]    Henrietta Shea is a 60 y.o. female who presents today for her medical conditions/complaints as noted below.      Chief Complaint   Patient presents with    New Patient     New patient - no concerns today, just getting established for medication refills    Nail Problem     Bilateral big toes and middle toes - patient states that she's concerned about fungus and she has ingrown toenails. Patient states middle toes are painful at times.        HPI:     Patient is new to provider.  Patient's past medical, surgical, family and social history was reviewed.  Medication list reconciled.  History of depression managed with Lexapro. Her mood is stable. Denies SI/HI.   She does counseling and couples counseling.   YADIRA on CPAP.  Toenail concerns: She took an oral antifungal a few years ago for the same thing and it did help and she tolerated it well.  She would like to discuss restarting an oral antifungal.  Thickening of her bilateral big toes, middle toes and the right little toe.  Sometimes causes some pain.    Labs due at NOV- September.     Patient Care Team:  Rosie Ford PA-C as PCP - General (Family Medicine)  Lisa Cadena MD as PCP - [facility]    No results found for: \"LABA1C\", \"UIH2DMVJ\"  Lab Results   Component Value Date    CHOL 180 09/14/2024    CHOL 182 07/11/2022    CHOL 174 07/20/2021     Lab Results   Component Value Date    TRIG 102 09/14/2024    TRIG 141 07/11/2022    TRIG 128 07/20/2021     Lab Results   Component Value Date    HDL 54 09/14/2024    HDL 50 07/11/2022    HDL 55 07/20/2021     Lab Results   Component Value Date     (H) 09/14/2024     07/11/2022    LDL 93 07/20/2021     Lab Results   Component Value Date    VLDL 20 09/14/2024    VLDL NOT REPORTED 07/20/2021     Lab Results   Component Value Date    CHOLHDLRATIO 3.0 09/14/2024

## 2025-06-10 SDOH — HEALTH STABILITY: PHYSICAL HEALTH: ON AVERAGE, HOW MANY MINUTES DO YOU ENGAGE IN EXERCISE AT THIS LEVEL?: 20 MIN

## 2025-06-10 SDOH — HEALTH STABILITY: PHYSICAL HEALTH: ON AVERAGE, HOW MANY DAYS PER WEEK DO YOU ENGAGE IN MODERATE TO STRENUOUS EXERCISE (LIKE A BRISK WALK)?: 1 DAY

## 2025-06-11 ENCOUNTER — OFFICE VISIT (OUTPATIENT)
Dept: PRIMARY CARE CLINIC | Age: 60
End: 2025-06-11
Payer: COMMERCIAL

## 2025-06-11 VITALS
HEIGHT: 63 IN | SYSTOLIC BLOOD PRESSURE: 136 MMHG | DIASTOLIC BLOOD PRESSURE: 76 MMHG | WEIGHT: 214.2 LBS | HEART RATE: 71 BPM | OXYGEN SATURATION: 97 % | BODY MASS INDEX: 37.95 KG/M2

## 2025-06-11 DIAGNOSIS — G47.33 OSA (OBSTRUCTIVE SLEEP APNEA): ICD-10-CM

## 2025-06-11 DIAGNOSIS — Z12.31 ENCOUNTER FOR SCREENING MAMMOGRAM FOR BREAST CANCER: ICD-10-CM

## 2025-06-11 DIAGNOSIS — F41.8 DEPRESSION WITH ANXIETY: ICD-10-CM

## 2025-06-11 DIAGNOSIS — B35.1 ONYCHOMYCOSIS OF TOENAIL: ICD-10-CM

## 2025-06-11 DIAGNOSIS — Z76.89 ENCOUNTER TO ESTABLISH CARE: Primary | ICD-10-CM

## 2025-06-11 LAB
ALBUMIN/GLOBULIN RATIO: 1.6 (ref 1–2.5)
ALBUMIN: 4.1 G/DL (ref 3.5–5.2)
ALP BLD-CCNC: 83 U/L (ref 35–104)
ALT SERPL-CCNC: 15 U/L (ref 10–35)
AST SERPL-CCNC: 17 U/L (ref 10–35)
BILIRUB SERPL-MCNC: 0.5 MG/DL (ref 0–1.2)
BILIRUBIN DIRECT: 0.2 MG/DL (ref 0–0.2)
BILIRUBIN, INDIRECT: 0.3 MG/DL (ref 0–1)
GLOBULIN: 2.5 G/DL
TOTAL PROTEIN: 6.6 G/DL (ref 6.6–8.7)

## 2025-06-11 PROCEDURE — 99214 OFFICE O/P EST MOD 30 MIN: CPT | Performed by: PHYSICIAN ASSISTANT

## 2025-06-11 RX ORDER — TERBINAFINE HYDROCHLORIDE 250 MG/1
250 TABLET ORAL DAILY
Qty: 84 TABLET | Refills: 0 | Status: SHIPPED | OUTPATIENT
Start: 2025-06-11 | End: 2025-09-03

## 2025-06-11 SDOH — ECONOMIC STABILITY: FOOD INSECURITY: WITHIN THE PAST 12 MONTHS, YOU WORRIED THAT YOUR FOOD WOULD RUN OUT BEFORE YOU GOT MONEY TO BUY MORE.: NEVER TRUE

## 2025-06-11 SDOH — ECONOMIC STABILITY: FOOD INSECURITY: WITHIN THE PAST 12 MONTHS, THE FOOD YOU BOUGHT JUST DIDN'T LAST AND YOU DIDN'T HAVE MONEY TO GET MORE.: NEVER TRUE

## 2025-06-11 ASSESSMENT — PATIENT HEALTH QUESTIONNAIRE - PHQ9
8. MOVING OR SPEAKING SO SLOWLY THAT OTHER PEOPLE COULD HAVE NOTICED. OR THE OPPOSITE, BEING SO FIGETY OR RESTLESS THAT YOU HAVE BEEN MOVING AROUND A LOT MORE THAN USUAL: NOT AT ALL
3. TROUBLE FALLING OR STAYING ASLEEP: SEVERAL DAYS
SUM OF ALL RESPONSES TO PHQ QUESTIONS 1-9: 2
5. POOR APPETITE OR OVEREATING: NOT AT ALL
10. IF YOU CHECKED OFF ANY PROBLEMS, HOW DIFFICULT HAVE THESE PROBLEMS MADE IT FOR YOU TO DO YOUR WORK, TAKE CARE OF THINGS AT HOME, OR GET ALONG WITH OTHER PEOPLE: NOT DIFFICULT AT ALL
1. LITTLE INTEREST OR PLEASURE IN DOING THINGS: NOT AT ALL
6. FEELING BAD ABOUT YOURSELF - OR THAT YOU ARE A FAILURE OR HAVE LET YOURSELF OR YOUR FAMILY DOWN: NOT AT ALL
SUM OF ALL RESPONSES TO PHQ QUESTIONS 1-9: 2
9. THOUGHTS THAT YOU WOULD BE BETTER OFF DEAD, OR OF HURTING YOURSELF: NOT AT ALL
2. FEELING DOWN, DEPRESSED OR HOPELESS: NOT AT ALL
4. FEELING TIRED OR HAVING LITTLE ENERGY: SEVERAL DAYS
7. TROUBLE CONCENTRATING ON THINGS, SUCH AS READING THE NEWSPAPER OR WATCHING TELEVISION: NOT AT ALL

## 2025-06-11 NOTE — ASSESSMENT & PLAN NOTE
See HPI and PE-  Discussed topical versus oral antifungal for treatment-patient elects to proceed with an oral antifungal.  Will send in course of terbinafine x 12 weeks. Discussed the benefits, side effects and risks of this medication.   Will get baseline liver function today with labs.

## 2025-06-12 ENCOUNTER — RESULTS FOLLOW-UP (OUTPATIENT)
Dept: PRIMARY CARE CLINIC | Age: 60
End: 2025-06-12

## 2025-07-14 DIAGNOSIS — F33.1 MODERATE EPISODE OF RECURRENT MAJOR DEPRESSIVE DISORDER (HCC): ICD-10-CM

## 2025-07-14 RX ORDER — ESCITALOPRAM OXALATE 20 MG/1
20 TABLET ORAL DAILY
Qty: 90 TABLET | Refills: 3 | Status: SHIPPED | OUTPATIENT
Start: 2025-07-14

## 2025-07-27 NOTE — PROGRESS NOTES
[facility]  [facility]  [address]  [place] [address]  Dept: [phone]    Henrietta Shea is a 60 y.o. female who presents today for her medical conditions/complaints as noted below.      Chief Complaint   Patient presents with    Knee Pain     Occasional sharp left knee pain. Patient reports it feels like the same pain she had when she tore her meniscus.    Medication Refill     Patient is requesting a refill on Lexapro.     HPI:     Patient presents to the office today for follow-up.  She was started on terbinafine for toenail onychomycosis in early June.  Liver function was checked at baseline before starting treatment.  She reports that her toenails look better in regards to color, no pain.   Big toe bilaterally, bilateral second toe and right fifth toe.   She denies nausea, anorexia, fatigue, vomiting, right upper abdominal pain, jaundice, dark urine, pale stools.     Murmur on PE- no dizziness, syncope, CP, leg swelling, SOB, palpitations.     Had some left lateral knee pain the other day, no longer bothering her.  Her along the lateral aspect of the knee down into the lateral lower leg and lateral thigh.  Worse with full flexion.  No swelling or redness or warmth of the knee joint.  Has a history of meniscus issues in the right knee.      Pertinent HPI from last office visit:  \"History of depression managed with Lexapro. Her mood is stable. Denies SI/HI.   She does counseling and couples counseling.   YADIRA on CPAP.  Toenail concerns: She took an oral antifungal a few years ago for the same thing and it did help and she tolerated it well.  She would like to discuss restarting an oral antifungal.  Thickening of her bilateral big toes, middle toes and the right little toe.  Sometimes causes some pain.  Labs due at NOV- September. \"     Patient Care Team:  Rosie Ford PA-C as PCP - General (Family Medicine)  Rosie Ford PA-C as PCP - [facility]

## 2025-07-28 ENCOUNTER — OFFICE VISIT (OUTPATIENT)
Dept: PRIMARY CARE CLINIC | Age: 60
End: 2025-07-28
Payer: COMMERCIAL

## 2025-07-28 VITALS
HEART RATE: 78 BPM | OXYGEN SATURATION: 98 % | SYSTOLIC BLOOD PRESSURE: 122 MMHG | HEIGHT: 63 IN | DIASTOLIC BLOOD PRESSURE: 88 MMHG | WEIGHT: 218.2 LBS | BODY MASS INDEX: 38.66 KG/M2

## 2025-07-28 DIAGNOSIS — F41.8 DEPRESSION WITH ANXIETY: ICD-10-CM

## 2025-07-28 DIAGNOSIS — R01.1 HEART MURMUR: ICD-10-CM

## 2025-07-28 DIAGNOSIS — B35.1 ONYCHOMYCOSIS OF TOENAIL: Primary | ICD-10-CM

## 2025-07-28 DIAGNOSIS — M25.562 LATERAL KNEE PAIN, LEFT: ICD-10-CM

## 2025-07-28 PROCEDURE — 99214 OFFICE O/P EST MOD 30 MIN: CPT | Performed by: PHYSICIAN ASSISTANT

## 2025-07-28 RX ORDER — ESCITALOPRAM OXALATE 20 MG/1
20 TABLET ORAL DAILY
Qty: 90 TABLET | Refills: 3 | Status: SHIPPED | OUTPATIENT
Start: 2025-07-28

## 2025-07-28 ASSESSMENT — PATIENT HEALTH QUESTIONNAIRE - PHQ9
9. THOUGHTS THAT YOU WOULD BE BETTER OFF DEAD, OR OF HURTING YOURSELF: NOT AT ALL
4. FEELING TIRED OR HAVING LITTLE ENERGY: NOT AT ALL
3. TROUBLE FALLING OR STAYING ASLEEP: NOT AT ALL
2. FEELING DOWN, DEPRESSED OR HOPELESS: SEVERAL DAYS
1. LITTLE INTEREST OR PLEASURE IN DOING THINGS: NOT AT ALL
5. POOR APPETITE OR OVEREATING: NOT AT ALL
SUM OF ALL RESPONSES TO PHQ QUESTIONS 1-9: 1
7. TROUBLE CONCENTRATING ON THINGS, SUCH AS READING THE NEWSPAPER OR WATCHING TELEVISION: NOT AT ALL
8. MOVING OR SPEAKING SO SLOWLY THAT OTHER PEOPLE COULD HAVE NOTICED. OR THE OPPOSITE, BEING SO FIGETY OR RESTLESS THAT YOU HAVE BEEN MOVING AROUND A LOT MORE THAN USUAL: NOT AT ALL
SUM OF ALL RESPONSES TO PHQ QUESTIONS 1-9: 1
10. IF YOU CHECKED OFF ANY PROBLEMS, HOW DIFFICULT HAVE THESE PROBLEMS MADE IT FOR YOU TO DO YOUR WORK, TAKE CARE OF THINGS AT HOME, OR GET ALONG WITH OTHER PEOPLE: NOT DIFFICULT AT ALL
SUM OF ALL RESPONSES TO PHQ QUESTIONS 1-9: 1
SUM OF ALL RESPONSES TO PHQ QUESTIONS 1-9: 1
6. FEELING BAD ABOUT YOURSELF - OR THAT YOU ARE A FAILURE OR HAVE LET YOURSELF OR YOUR FAMILY DOWN: NOT AT ALL

## 2025-07-28 NOTE — ASSESSMENT & PLAN NOTE
See HPI-  No longer bothersome.  Discussed that it could be iliotibial band pain syndrome.  Will print out home exercises for her to try.  Discussed as needed NSAID use, icing, activity modification.  If symptoms persist or worsen return to office for further evaluation.

## 2025-07-28 NOTE — ASSESSMENT & PLAN NOTE
See HPI and PE-  Reports improvement in toenail color and resolution of toenail pain since starting terbinafine 6 weeks ago.  Continue treatment for total of 12 weeks.

## (undated) DEVICE — GLOVE ORANGE PI 7   MSG9070

## (undated) DEVICE — Device: Brand: DEFENDO VALVE AND CONNECTOR KIT

## (undated) DEVICE — PERRYSBURG ENDO PACK: Brand: MEDLINE INDUSTRIES, INC.

## (undated) DEVICE — ADAPTER CLEANING PORPOISE CLEANING